# Patient Record
Sex: FEMALE | Race: BLACK OR AFRICAN AMERICAN | Employment: FULL TIME | URBAN - METROPOLITAN AREA
[De-identification: names, ages, dates, MRNs, and addresses within clinical notes are randomized per-mention and may not be internally consistent; named-entity substitution may affect disease eponyms.]

---

## 2019-08-20 ENCOUNTER — ANESTHESIA EVENT (OUTPATIENT)
Dept: SURGERY | Age: 54
End: 2019-08-20
Payer: OTHER GOVERNMENT

## 2019-08-20 RX ORDER — OMEPRAZOLE 40 MG/1
CAPSULE, DELAYED RELEASE ORAL
COMMUNITY
Start: 2019-05-06

## 2019-08-20 RX ORDER — FLUTICASONE PROPIONATE 50 MCG
SPRAY, SUSPENSION (ML) NASAL
COMMUNITY
Start: 2018-02-28

## 2019-08-20 RX ORDER — LORATADINE 10 MG/1
TABLET ORAL
COMMUNITY
Start: 2018-02-13

## 2019-08-20 RX ORDER — POLYETHYLENE GLYCOL 3350 17 G/17G
POWDER, FOR SOLUTION ORAL
COMMUNITY
Start: 2019-06-19

## 2019-08-20 RX ORDER — DICLOFENAC SODIUM 10 MG/G
GEL TOPICAL
COMMUNITY
Start: 2018-02-13

## 2019-08-20 NOTE — PERIOP NOTES
PAT - SURGICAL PRE-ADMISSION INSTRUCTIONS    NAME:  Sylvester Hernandez                                                          TODAY'S DATE:  8/20/2019    SURGERY DATE:  8/21/2019                                  SURGERY ARRIVAL TIME:   1145 PER OFFICE, TBV    1. Do NOT eat or drink anything, including candy or gum, after MIDNIGHT on 8/20/19 , unless you have specific instructions from your Surgeon or Anesthesia Provider to do so. 2. No smoking on the day of surgery. 3. No alcohol 24 hours prior to the day of surgery. 4. No recreational drugs for one week prior to the day of surgery. 5. Leave all valuables, including money/purse, at home. 6. Remove all jewelry, nail polish, makeup (including mascara); no lotions, powders, deodorant, or perfume/cologne/after shave. 7. Glasses/Contact lenses and Dentures may be worn to the hospital.  They will be removed prior to surgery. 8. Call your doctor if symptoms of a cold or illness develop within 24 ours prior to surgery. 9. AN ADULT MUST DRIVE YOU HOME AFTER OUTPATIENT SURGERY. 10. If you are having an OUTPATIENT procedure, please make arrangements for a responsible adult to be with you for 24 hours after your surgery. 11. If you are admitted to the hospital, you will be assigned to a bed after surgery is complete. Normally a family member will not be able to see you until you are in your assigned bed. 15. Family is encouraged to accompany you to the hospital.  We ask visitors in the treatment area to be limited to ONE person at a time to ensure patient privacy. EXCEPTIONS WILL BE MADE AS NEEDED. 15. Children under 12 are discouraged from entering the treatment area and need to be supervised by an adult when in the waiting room. Special Instructions:     Take these medications the morning of surgery with a sip of water:  OMEPRAZOLE    Patient Prep:    shower with anti-bacterial soap    These surgical instructions were reviewed with PATIENT during the PAT PHONE CALL.   Directions: On the morning of surgery, please go to the [address] [place] [address]. Enter the building from the [facility] entrance, 1st floor (next to the Emergency Room entrance). Take the elevator to the 2nd floor. Sign in at the Registration Desk.     If you have any questions and/or concerns, please do not hesitate to call:  (Prior to the day of surgery)  [facility] unit:  [phone]  (Day of surgery)  [facility] unit:  [phone]

## 2019-08-21 ENCOUNTER — HOSPITAL ENCOUNTER (OUTPATIENT)
Age: 54
Setting detail: OUTPATIENT SURGERY
Discharge: HOME | End: 2019-08-21
Attending: PLASTIC SURGERY | Admitting: PLASTIC SURGERY
Payer: OTHER GOVERNMENT

## 2019-08-21 ENCOUNTER — ANESTHESIA (OUTPATIENT)
Dept: SURGERY | Age: 54
End: 2019-08-21
Payer: OTHER GOVERNMENT

## 2019-08-21 VITALS
OXYGEN SATURATION: 96 % | SYSTOLIC BLOOD PRESSURE: 108 MMHG | TEMPERATURE: 97.6 F | HEIGHT: 64 IN | DIASTOLIC BLOOD PRESSURE: 68 MMHG | BODY MASS INDEX: 31.66 KG/M2 | RESPIRATION RATE: 16 BRPM | WEIGHT: 185.44 LBS | HEART RATE: 72 BPM

## 2019-08-21 DIAGNOSIS — M67.441 GANGLION OF FLEXOR TENDON SHEATH OF RIGHT MIDDLE FINGER: ICD-10-CM

## 2019-08-21 DIAGNOSIS — M65.331 TRIGGER FINGER, RIGHT MIDDLE FINGER: ICD-10-CM

## 2019-08-21 DIAGNOSIS — Z98.890 POST-OPERATIVE STATE: Primary | ICD-10-CM

## 2019-08-21 PROCEDURE — 88304 TISSUE EXAM BY PATHOLOGIST: CPT

## 2019-08-21 PROCEDURE — 77030002986 HC SUT PROL J&J -A: Performed by: PLASTIC SURGERY

## 2019-08-21 PROCEDURE — 74011000250 HC RX REV CODE- 250: Performed by: PLASTIC SURGERY

## 2019-08-21 PROCEDURE — 74011250636 HC RX REV CODE- 250/636: Performed by: NURSE ANESTHETIST, CERTIFIED REGISTERED

## 2019-08-21 PROCEDURE — 77030018836 HC SOL IRR NACL ICUM -A: Performed by: PLASTIC SURGERY

## 2019-08-21 PROCEDURE — 74011250636 HC RX REV CODE- 250/636

## 2019-08-21 PROCEDURE — 76210000020 HC REC RM PH II FIRST 0.5 HR: Performed by: PLASTIC SURGERY

## 2019-08-21 PROCEDURE — 77030033827 HC SLV COMPR SCD THGH COVD -B: Performed by: PLASTIC SURGERY

## 2019-08-21 PROCEDURE — 76010000138 HC OR TIME 0.5 TO 1 HR: Performed by: PLASTIC SURGERY

## 2019-08-21 PROCEDURE — 74011250636 HC RX REV CODE- 250/636: Performed by: PLASTIC SURGERY

## 2019-08-21 PROCEDURE — 74011250637 HC RX REV CODE- 250/637: Performed by: NURSE ANESTHETIST, CERTIFIED REGISTERED

## 2019-08-21 PROCEDURE — 77030021122 HC SPLNT MAT FST BSNM -A: Performed by: PLASTIC SURGERY

## 2019-08-21 PROCEDURE — 76060000032 HC ANESTHESIA 0.5 TO 1 HR: Performed by: PLASTIC SURGERY

## 2019-08-21 PROCEDURE — 77030002996 HC SUT SLK J&J -A: Performed by: PLASTIC SURGERY

## 2019-08-21 PROCEDURE — 74011000272 HC RX REV CODE- 272: Performed by: PLASTIC SURGERY

## 2019-08-21 PROCEDURE — 77030002933 HC SUT MCRYL J&J -A: Performed by: PLASTIC SURGERY

## 2019-08-21 RX ORDER — MIDAZOLAM HYDROCHLORIDE 1 MG/ML
INJECTION, SOLUTION INTRAMUSCULAR; INTRAVENOUS AS NEEDED
Status: DISCONTINUED | OUTPATIENT
Start: 2019-08-21 | End: 2019-08-21 | Stop reason: HOSPADM

## 2019-08-21 RX ORDER — LIDOCAINE HYDROCHLORIDE 20 MG/ML
INJECTION, SOLUTION EPIDURAL; INFILTRATION; INTRACAUDAL; PERINEURAL AS NEEDED
Status: DISCONTINUED | OUTPATIENT
Start: 2019-08-21 | End: 2019-08-21 | Stop reason: HOSPADM

## 2019-08-21 RX ORDER — IBUPROFEN 600 MG/1
TABLET ORAL
Qty: 20 TAB | Refills: 0 | Status: SHIPPED | OUTPATIENT
Start: 2019-08-21

## 2019-08-21 RX ORDER — FAMOTIDINE 20 MG/1
20 TABLET, FILM COATED ORAL ONCE
Status: COMPLETED | OUTPATIENT
Start: 2019-08-21 | End: 2019-08-21

## 2019-08-21 RX ORDER — CEFAZOLIN SODIUM 2 G/50ML
2 SOLUTION INTRAVENOUS
Status: DISCONTINUED | OUTPATIENT
Start: 2019-08-21 | End: 2019-08-21 | Stop reason: HOSPADM

## 2019-08-21 RX ORDER — LIDOCAINE HYDROCHLORIDE 10 MG/ML
0.1 INJECTION, SOLUTION EPIDURAL; INFILTRATION; INTRACAUDAL; PERINEURAL AS NEEDED
Status: DISCONTINUED | OUTPATIENT
Start: 2019-08-21 | End: 2019-08-21 | Stop reason: HOSPADM

## 2019-08-21 RX ORDER — ONDANSETRON HYDROCHLORIDE 4 MG/2ML
INJECTION, SOLUTION INTRAMUSCULAR; INTRAVENOUS AS NEEDED
Status: DISCONTINUED | OUTPATIENT
Start: 2019-08-21 | End: 2019-08-21

## 2019-08-21 RX ORDER — PROPOFOL 10 MG/ML
INJECTION, EMULSION INTRAVENOUS
Status: DISCONTINUED | OUTPATIENT
Start: 2019-08-21 | End: 2019-08-21 | Stop reason: HOSPADM

## 2019-08-21 RX ORDER — PROPOFOL 10 MG/ML
INJECTION, EMULSION INTRAVENOUS AS NEEDED
Status: DISCONTINUED | OUTPATIENT
Start: 2019-08-21 | End: 2019-08-21 | Stop reason: HOSPADM

## 2019-08-21 RX ORDER — SODIUM CHLORIDE, SODIUM LACTATE, POTASSIUM CHLORIDE, CALCIUM CHLORIDE 600; 310; 30; 20 MG/100ML; MG/100ML; MG/100ML; MG/100ML
50 INJECTION, SOLUTION INTRAVENOUS CONTINUOUS
Status: DISCONTINUED | OUTPATIENT
Start: 2019-08-21 | End: 2019-08-21 | Stop reason: HOSPADM

## 2019-08-21 RX ORDER — FENTANYL CITRATE 50 UG/ML
INJECTION, SOLUTION INTRAMUSCULAR; INTRAVENOUS AS NEEDED
Status: DISCONTINUED | OUTPATIENT
Start: 2019-08-21 | End: 2019-08-21 | Stop reason: HOSPADM

## 2019-08-21 RX ORDER — HYDROCODONE BITARTRATE AND ACETAMINOPHEN 5; 325 MG/1; MG/1
1 TABLET ORAL
Qty: 5 TAB | Refills: 0 | Status: SHIPPED | OUTPATIENT
Start: 2019-08-21 | End: 2019-08-28

## 2019-08-21 RX ADMIN — PROPOFOL 20 MG: 10 INJECTION, EMULSION INTRAVENOUS at 14:28

## 2019-08-21 RX ADMIN — FENTANYL CITRATE 12.5 MCG: 50 INJECTION, SOLUTION INTRAMUSCULAR; INTRAVENOUS at 14:39

## 2019-08-21 RX ADMIN — SODIUM CHLORIDE, SODIUM LACTATE, POTASSIUM CHLORIDE, AND CALCIUM CHLORIDE 50 ML/HR: 600; 310; 30; 20 INJECTION, SOLUTION INTRAVENOUS at 13:08

## 2019-08-21 RX ADMIN — FAMOTIDINE 20 MG: 20 TABLET ORAL at 13:08

## 2019-08-21 RX ADMIN — FENTANYL CITRATE 25 MCG: 50 INJECTION, SOLUTION INTRAMUSCULAR; INTRAVENOUS at 14:25

## 2019-08-21 RX ADMIN — FENTANYL CITRATE 12.5 MCG: 50 INJECTION, SOLUTION INTRAMUSCULAR; INTRAVENOUS at 15:00

## 2019-08-21 RX ADMIN — PROPOFOL 100 MCG/KG/MIN: 10 INJECTION, EMULSION INTRAVENOUS at 14:27

## 2019-08-21 RX ADMIN — PROPOFOL 10 MG: 10 INJECTION, EMULSION INTRAVENOUS at 14:29

## 2019-08-21 RX ADMIN — LIDOCAINE HYDROCHLORIDE 20 MG: 20 INJECTION, SOLUTION EPIDURAL; INFILTRATION; INTRACAUDAL; PERINEURAL at 14:26

## 2019-08-21 RX ADMIN — MIDAZOLAM HYDROCHLORIDE 2 MG: 1 INJECTION, SOLUTION INTRAMUSCULAR; INTRAVENOUS at 14:22

## 2019-08-21 RX ADMIN — PROPOFOL 50 MG: 10 INJECTION, EMULSION INTRAVENOUS at 14:26

## 2019-08-21 RX ADMIN — FENTANYL CITRATE 12.5 MCG: 50 INJECTION, SOLUTION INTRAMUSCULAR; INTRAVENOUS at 14:42

## 2019-08-21 RX ADMIN — FENTANYL CITRATE 12.5 MCG: 50 INJECTION, SOLUTION INTRAMUSCULAR; INTRAVENOUS at 14:53

## 2019-08-21 RX ADMIN — PROPOFOL 20 MG: 10 INJECTION, EMULSION INTRAVENOUS at 14:27

## 2019-08-21 NOTE — DISCHARGE INSTRUCTIONS
Patient armband removed and given to patient to take home. Patient was informed of the privacy risks if armband lost or stolen    DISCHARGE SUMMARY from Nurse    PATIENT INSTRUCTIONS:    After general anesthesia or intravenous sedation, for 24 hours or while taking prescription Narcotics:  · Limit your activities  · Do not drive and operate hazardous machinery  · Do not make important personal or business decisions  · Do  not drink alcoholic beverages  · If you have not urinated within 8 hours after discharge, please contact your surgeon on call. Report the following to your surgeon:  · Excessive pain, swelling, redness or odor of or around the surgical area  · Temperature over 100.5  · Nausea and vomiting lasting longer than 4 hours or if unable to take medications  · Any signs of decreased circulation or nerve impairment to extremity: change in color, persistent  numbness, tingling, coldness or increase pain  · Any questions    What to do at Home:    These are general instructions for a healthy lifestyle:    No smoking/ No tobacco products/ Avoid exposure to second hand smoke  Surgeon General's Warning:  Quitting smoking now greatly reduces serious risk to your health. Obesity, smoking, and sedentary lifestyle greatly increases your risk for illness    A healthy diet, regular physical exercise & weight monitoring are important for maintaining a healthy lifestyle    You may be retaining fluid if you have a history of heart failure or if you experience any of the following symptoms:  Weight gain of 3 pounds or more overnight or 5 pounds in a week, increased swelling in our hands or feet or shortness of breath while lying flat in bed. Please call your doctor as soon as you notice any of these symptoms; do not wait until your next office visit. The discharge information has been reviewed with the {PATIENT PARENT GUARDIAN:08281}. The {PATIENT PARENT GUARDIAN:65095} verbalized understanding.   Discharge medications reviewed with the {Dishcarge meds reviewed YJWZ:71388} and appropriate educational materials and side effects teaching were provided.   ___________________________________________________________________________________________________________________________________

## 2019-08-21 NOTE — BRIEF OP NOTE
BRIEF OPERATIVE NOTE      BRIEF OPERATIVE NOTE    Patient: Lis Costa MRN: [id number]  CSN: [id number]    YOB: 1965  Age: 49 y.o. Sex: female        Date of Procedure: 8/21/2019     Preoperative Diagnosis: Right Middle Finger Mass, Right Middle Finger Trigger Digit Release M67.40, M65.331    Postoperative Diagnosis: Right Middle Finger Mass, Right Middle Finger Trigger Digit Release M67.40, M65.331      Procedure: Procedure(s):  Excision of Right Middle Finger Mass, Right Middle Finger Trigger Digit Release     Surgeon: Dudley Brooks MD      First Assistant:   EDDA  Anesthesia Staff: Anesthesiologist: Mike Corrigan MD  CRNA: Janice Bell CRNA    Anesthesia: MAC      Local Used:   1 % lidocaine and 0.5 % marcaine  50:50 mix    Fluid:  400 cc crystalloid    Tourniquet Time:    Total Tourniquet Time Documented:  Upper Arm (Right) - 27 minutes  Total: Upper Arm (Right) - 27 minutes   @  175 mm Hg.     Estimated Blood Loss: < 5 cc    Specimens:   ID Type Source Tests Collected by Time Destination   1 : ganglion cyst, right middle finger Preservative   Dane Foreman MD 8/21/2019 1500 Pathology        Implants: * No implants in log *    Findings: Large ganglion at the A2 pulley    Complications: None; patient tolerated the procedure well.  Nikolay Crocker MD  8/21/2019  3:22 PM    Dictation Number: #[id number]

## 2019-08-21 NOTE — PERIOP NOTES
Phase 2 Recovery Summary  Patient arrived to Phase 2 at 1521  Report received from OR  Vitals:    08/20/19 0935 08/21/19 1303 08/21/19 1521 08/21/19 1526   BP:  123/79 108/68 108/68   Pulse:  72 72 72   Resp:  16 14 16   Temp:  97.6 °F (36.4 °C)     SpO2:  100% 96% 96%   Weight: 83.5 kg (184 lb) 84.1 kg (185 lb 7 oz)     Height: 5' 4\" (1.626 m)          oriented to time, place, person and situation    Lines and Drains  Peripheral Intravenous Line:  Removed prior to discharge     Wound  Wound Finger (Comment which one) Right middle (Active)   Dressing Status Clean, dry, and intact 8/21/2019  3:31 PM   Dressing Type 4 x 4;Xeroform; Other (Comment) 8/21/2019  3:31 PM   Incision Site Well Approximated Yes 8/21/2019  3:11 PM   Drainage Amount None 8/21/2019  3:31 PM   Number of days: 0      Discharge  instructions reviewed with patient and . They verbalized understanding.      Patient discharged to home with   at [address]

## 2019-08-21 NOTE — ANESTHESIA POSTPROCEDURE EVALUATION
Procedure(s):  Excision of Right Middle Finger Mass, Right Middle Finger Trigger Digit Release. MAC    Anesthesia Post Evaluation      Multimodal analgesia: multimodal analgesia used between 6 hours prior to anesthesia start to PACU discharge  Patient location during evaluation: bedside  Patient participation: complete - patient participated  Level of consciousness: awake  Pain management: adequate  Airway patency: patent  Anesthetic complications: no  Cardiovascular status: stable  Respiratory status: acceptable  Hydration status: acceptable  Post anesthesia nausea and vomiting:  controlled      No vitals data found for the desired time range.

## 2019-08-21 NOTE — INTERVAL H&P NOTE
H&P Update:  Nati Snyder was seen and examined. History and physical has been reviewed. The patient has been examined. There have been no significant clinical changes since the completion of the originally dated History and Physical.  Discussed right middle finger cyst excision and trigger release. All questions answered.

## 2019-08-21 NOTE — PERIOP NOTES
Pre-Op Summary    Pt arrived via car with family/friend and is oriented to time, place, person and situation. Patient with steady gait with none assistive devices. Visit Vitals  /79 (BP 1 Location: Left arm, BP Patient Position: At rest)   Pulse 72   Temp 97.6 °F (36.4 °C)   Resp 16   Ht 5' 4\" (1.626 m)   Wt 84.1 kg (185 lb 7 oz)   SpO2 100%   BMI 31.83 kg/m²       Peripheral IV located on Left antecubital .    Patients belongings are located with . Patient's point of contact is Pancho Kumar,  and their contact number is: [phone]. They will be in the waiting room. They are able to receive medication information. They will be their ride home.

## 2019-08-22 NOTE — OP NOTES
[facility]  OPERATIVE REPORT    Name:  Carlos Ortega  MR#:   [id number]  :  1965  ACCOUNT #:  [de-identified]  DATE OF SERVICE:  2019    PREOPERATIVE DIAGNOSES:  1. Right middle finger mass consistent with ganglion cyst.  2.  Trigger finger, right middle finger. POSTOPERATIVE DIAGNOSES:  1. Right middle finger mass consistent with ganglion cyst.  2.  Trigger finger, right middle finger. PROCEDURES PERFORMED:  1. Excision of right middle finger ganglion cyst.  2.  Release of right middle finger trigger digit. SURGEON:  Chad Ren MD    ASSISTANT:  EDDA.    ANESTHESIA:  Local (1% lidocaine and 0.5% Marcaine plain) and MAC. FLUIDS ADMINISTERED:  400 mL of crystalloid. TOURNIQUET TIME:  26 minutes at 175 mmHg. COMPLICATIONS:  None. SPECIMENS REMOVED:  Ganglion cyst from the right middle finger A2 pulley region. IMPLANTS:  None. ESTIMATED BLOOD LOSS:  Less than 5 mL. FINDINGS:  Large ganglion cyst at the A2 pulley. PROCEDURE:  The patient is a 52-year-old female with a history of pain in the right middle finger. She was previously seen, underwent evaluation, found to have exam consistent with ganglion cyst at the A2 pulley region and trigger finger. We undertook discussions regarding treatment options and she has elected to undergo surgical excision of the ganglion cyst and release of right middle finger trigger digit. After proper informed consent was obtained, she was brought to the operating room in the surgical gurney, and the right upper extremity was extended on an arm board. A well-padded tourniquet was placed on the right upper extremity and once sedation was achieved by Anesthesia, surgical sites were cleansed with isopropyl alcohol and infiltrated with local anesthetic. Right upper extremity was then prepped and draped for a sterile field using ChloraPrep solution. Incision was designed using a skin marker.   Veronica incision was utilized with the vertex of the incision overlying the mass at the A2 pulley region of the middle finger. Arm was elevated for gravity exsanguination and tourniquet was inflated to 175 mmHg. Incision was made. Dissection carried through the skin into the subcutaneous tissues using sharp and blunt dissection. Neurovascular bundles were identified and preserved and flexor sheath was identified. Mass was encountered at the distal aspect of the A2 pulley. Mass was isolated from the surrounding tissues using blunt and sharp dissection under loupe magnification. Mass was found to emanate through the A2 pulley. It was excised in its entirety including a small cuff of A2 pulley retinaculum. This was passed off the field in formalin to be evaluated for histology. Attention was then directed to the A1 pulley release. Through the proximal aspect of the incision, dissection was carried through the proximal tissues. A1 pulley was identified and released using tenotomy scissors under loupe magnification. Full release was accomplished and ranging the finger revealed no evidence of catching or locking. At this point, tourniquet was deflated thus restoring blood flow to the hand. Once hemostasis was assured, closure of the incision was accomplished using 4-0 Prolene in a horizontal mattress fashion. Sterile dressings were applied consisting of Xeroform, 4 x 4s and Blanca wrap. She was awakened from sedation and taken to the phase II recovery in stable condition. There were no complications. The patient tolerated the procedure well. Counts were correct at the conclusion of the procedure. Dr. Pastor Mancera performed the entire procedure.       MD KATHY Damon/[email or web address]  D:  08/21/2019 15:34  T:  08/21/2019 17:34  JOB #:  [id number]

## 2024-05-20 ENCOUNTER — TRANSCRIBE ORDERS (OUTPATIENT)
Facility: HOSPITAL | Age: 59
End: 2024-05-20

## 2024-05-20 ENCOUNTER — HOSPITAL ENCOUNTER (OUTPATIENT)
Facility: HOSPITAL | Age: 59
Discharge: HOME | End: 2024-05-23
Payer: OTHER GOVERNMENT

## 2024-05-20 DIAGNOSIS — M17.12 PRIMARY OSTEOARTHRITIS OF LEFT KNEE: ICD-10-CM

## 2024-05-20 DIAGNOSIS — M17.12 PRIMARY OSTEOARTHRITIS OF LEFT KNEE: Primary | ICD-10-CM

## 2024-05-20 LAB
ANION GAP SERPL CALC-SCNC: 6 MMOL/L (ref 3–18)
APPEARANCE UR: CLEAR
APTT PPP: 25.7 SEC (ref 23–36.4)
BACTERIA URNS QL MICRO: ABNORMAL /HPF
BASOPHILS # BLD: 0.1 K/UL (ref 0–0.1)
BASOPHILS NFR BLD: 1 % (ref 0–2)
BILIRUB UR QL: NEGATIVE
BUN SERPL-MCNC: 12 MG/DL (ref 7–18)
BUN/CREAT SERPL: 15 (ref 12–20)
CALCIUM SERPL-MCNC: 9.8 MG/DL (ref 8.5–10.1)
CHLORIDE SERPL-SCNC: 102 MMOL/L (ref 100–111)
CO2 SERPL-SCNC: 31 MMOL/L (ref 21–32)
COLOR UR: YELLOW
CREAT SERPL-MCNC: 0.8 MG/DL (ref 0.6–1.3)
DIFFERENTIAL METHOD BLD: NORMAL
EKG ATRIAL RATE: 70 BPM
EKG DIAGNOSIS: NORMAL
EKG P AXIS: 82 DEGREES
EKG P-R INTERVAL: 180 MS
EKG Q-T INTERVAL: 400 MS
EKG QRS DURATION: 78 MS
EKG QTC CALCULATION (BAZETT): 432 MS
EKG R AXIS: 51 DEGREES
EKG T AXIS: 59 DEGREES
EKG VENTRICULAR RATE: 70 BPM
EOSINOPHIL # BLD: 0.1 K/UL (ref 0–0.4)
EOSINOPHIL NFR BLD: 2 % (ref 0–5)
EPITH CASTS URNS QL MICRO: ABNORMAL /LPF (ref 0–5)
ERYTHROCYTE [DISTWIDTH] IN BLOOD BY AUTOMATED COUNT: 13.2 % (ref 11.6–14.5)
EST. AVERAGE GLUCOSE BLD GHB EST-MCNC: 111 MG/DL
GLUCOSE SERPL-MCNC: 95 MG/DL (ref 74–99)
GLUCOSE UR STRIP.AUTO-MCNC: NEGATIVE MG/DL
HBA1C MFR BLD: 5.5 % (ref 4.2–5.6)
HCG SERPL QL: NEGATIVE
HCT VFR BLD AUTO: 42.8 % (ref 35–45)
HGB BLD-MCNC: 13.8 G/DL (ref 12–16)
HGB UR QL STRIP: ABNORMAL
IMM GRANULOCYTES # BLD AUTO: 0 K/UL (ref 0–0.04)
IMM GRANULOCYTES NFR BLD AUTO: 0 % (ref 0–0.5)
INR PPP: 0.9 (ref 0.9–1.1)
KETONES UR QL STRIP.AUTO: NEGATIVE MG/DL
LEUKOCYTE ESTERASE UR QL STRIP.AUTO: ABNORMAL
LYMPHOCYTES # BLD: 2.7 K/UL (ref 0.9–3.6)
LYMPHOCYTES NFR BLD: 46 % (ref 21–52)
MCH RBC QN AUTO: 29.9 PG (ref 24–34)
MCHC RBC AUTO-ENTMCNC: 32.2 G/DL (ref 31–37)
MCV RBC AUTO: 92.6 FL (ref 78–100)
MONOCYTES # BLD: 0.5 K/UL (ref 0.05–1.2)
MONOCYTES NFR BLD: 8 % (ref 3–10)
NEUTS SEG # BLD: 2.6 K/UL (ref 1.8–8)
NEUTS SEG NFR BLD: 44 % (ref 40–73)
NITRITE UR QL STRIP.AUTO: POSITIVE
NRBC # BLD: 0 K/UL (ref 0–0.01)
NRBC BLD-RTO: 0 PER 100 WBC
PH UR STRIP: 6.5 (ref 5–8)
PLATELET # BLD AUTO: 334 K/UL (ref 135–420)
PMV BLD AUTO: 9.5 FL (ref 9.2–11.8)
POTASSIUM SERPL-SCNC: 3.7 MMOL/L (ref 3.5–5.5)
PROT UR STRIP-MCNC: NEGATIVE MG/DL
PROTHROMBIN TIME: 12.5 SEC (ref 11.9–14.7)
RBC # BLD AUTO: 4.62 M/UL (ref 4.2–5.3)
RBC #/AREA URNS HPF: ABNORMAL /HPF (ref 0–5)
SODIUM SERPL-SCNC: 139 MMOL/L (ref 136–145)
SP GR UR REFRACTOMETRY: 1.02 (ref 1–1.03)
UROBILINOGEN UR QL STRIP.AUTO: 1 EU/DL (ref 0.2–1)
WBC # BLD AUTO: 6 K/UL (ref 4.6–13.2)
WBC URNS QL MICRO: ABNORMAL /HPF (ref 0–5)

## 2024-05-20 PROCEDURE — 84703 CHORIONIC GONADOTROPIN ASSAY: CPT

## 2024-05-20 PROCEDURE — 87086 URINE CULTURE/COLONY COUNT: CPT

## 2024-05-20 PROCEDURE — 81001 URINALYSIS AUTO W/SCOPE: CPT

## 2024-05-20 PROCEDURE — 83036 HEMOGLOBIN GLYCOSYLATED A1C: CPT

## 2024-05-20 PROCEDURE — 87088 URINE BACTERIA CULTURE: CPT

## 2024-05-20 PROCEDURE — 71045 X-RAY EXAM CHEST 1 VIEW: CPT

## 2024-05-20 PROCEDURE — 85610 PROTHROMBIN TIME: CPT

## 2024-05-20 PROCEDURE — 93005 ELECTROCARDIOGRAM TRACING: CPT

## 2024-05-20 PROCEDURE — 36415 COLL VENOUS BLD VENIPUNCTURE: CPT

## 2024-05-20 PROCEDURE — 85025 COMPLETE CBC W/AUTO DIFF WBC: CPT

## 2024-05-20 PROCEDURE — 87186 SC STD MICRODIL/AGAR DIL: CPT

## 2024-05-20 PROCEDURE — 85730 THROMBOPLASTIN TIME PARTIAL: CPT

## 2024-05-20 PROCEDURE — 80048 BASIC METABOLIC PNL TOTAL CA: CPT

## 2024-05-21 LAB
BACTERIA SPEC CULT: NORMAL
BACTERIA SPEC CULT: NORMAL
SERVICE CMNT-IMP: NORMAL

## 2024-05-22 LAB
BACTERIA SPEC CULT: ABNORMAL
CC UR VC: ABNORMAL
SERVICE CMNT-IMP: ABNORMAL

## 2024-05-29 ENCOUNTER — HOSPITAL ENCOUNTER (OUTPATIENT)
Facility: HOSPITAL | Age: 59
Discharge: HOME | End: 2024-06-01
Attending: ORTHOPAEDIC SURGERY
Payer: OTHER GOVERNMENT

## 2024-05-29 DIAGNOSIS — M17.12 ARTHRITIS OF LEFT KNEE: ICD-10-CM

## 2024-05-29 PROCEDURE — 73700 CT LOWER EXTREMITY W/O DYE: CPT

## 2024-06-04 ENCOUNTER — HOSPITAL ENCOUNTER (OUTPATIENT)
Facility: HOSPITAL | Age: 59
Discharge: HOME | End: 2024-06-07
Payer: OTHER GOVERNMENT

## 2024-06-04 ENCOUNTER — TRANSCRIBE ORDERS (OUTPATIENT)
Facility: HOSPITAL | Age: 59
End: 2024-06-04

## 2024-06-04 DIAGNOSIS — M17.12 OSTEOARTHRITIS OF LEFT KNEE, UNSPECIFIED OSTEOARTHRITIS TYPE: ICD-10-CM

## 2024-06-04 DIAGNOSIS — M17.12 OSTEOARTHRITIS OF LEFT KNEE, UNSPECIFIED OSTEOARTHRITIS TYPE: Primary | ICD-10-CM

## 2024-06-04 LAB
APPEARANCE UR: CLEAR
BACTERIA URNS QL MICRO: NORMAL /HPF
BILIRUB UR QL: NEGATIVE
COLOR UR: YELLOW
EPITH CASTS URNS QL MICRO: NORMAL /LPF (ref 0–5)
GLUCOSE UR STRIP.AUTO-MCNC: NEGATIVE MG/DL
HGB UR QL STRIP: ABNORMAL
KETONES UR QL STRIP.AUTO: NEGATIVE MG/DL
LEUKOCYTE ESTERASE UR QL STRIP.AUTO: NEGATIVE
NITRITE UR QL STRIP.AUTO: NEGATIVE
PH UR STRIP: 7 (ref 5–8)
PROT UR STRIP-MCNC: NEGATIVE MG/DL
RBC #/AREA URNS HPF: NORMAL /HPF (ref 0–5)
SP GR UR REFRACTOMETRY: 1.03 (ref 1–1.03)
UROBILINOGEN UR QL STRIP.AUTO: 1 EU/DL (ref 0.2–1)
WBC URNS QL MICRO: NEGATIVE /HPF (ref 0–5)

## 2024-06-04 PROCEDURE — 87086 URINE CULTURE/COLONY COUNT: CPT

## 2024-06-04 PROCEDURE — 81001 URINALYSIS AUTO W/SCOPE: CPT

## 2024-06-04 PROCEDURE — 36415 COLL VENOUS BLD VENIPUNCTURE: CPT

## 2024-06-05 LAB
BACTERIA SPEC CULT: NORMAL
SERVICE CMNT-IMP: NORMAL

## 2024-06-17 ENCOUNTER — HOSPITAL ENCOUNTER (OUTPATIENT)
Facility: HOSPITAL | Age: 59
Setting detail: RECURRING SERIES
Discharge: HOME | End: 2024-06-20
Payer: OTHER GOVERNMENT

## 2024-06-17 ENCOUNTER — TELEPHONE (OUTPATIENT)
Facility: HOSPITAL | Age: 59
End: 2024-06-17

## 2024-06-17 PROCEDURE — 97535 SELF CARE MNGMENT TRAINING: CPT

## 2024-06-17 PROCEDURE — 97530 THERAPEUTIC ACTIVITIES: CPT

## 2024-06-17 PROCEDURE — 97161 PT EVAL LOW COMPLEX 20 MIN: CPT

## 2024-06-17 NOTE — PROGRESS NOTES
return to normal with this functional activity and improve safety on stairs.  Pre-Op Eval: step-to pattern, descents more difficult than ascents  Post-Op Reassess: TBD     Patient will be able to perform at least 15 reps of sit <> stands with good form/control during 30\" STS test to demonstrate improved LE strength and stability, thus reducing the patients risk of falls.  Pre-Op Eval: 9 reps, without use of hands but decreased eccentric control during stand to sits   Post-Op Reassess: TBD       Frequency / Duration: Patient to be seen 5x/week x 11 visits, then 3x/week x 24 visits (total of 35 visits).    Patient/ Caregiver education and instruction: Diagnosis, prognosis, self care, activity modification, and other answered patient's questions      [x]  Plan of care has been reviewed with PTA    Certification Period: n/a  Carolyn Mancera, PT 6/17/2024 8:05 AM  ____________________________________________________________________  I certify that the above Therapy Services are being furnished while the patient is under my care. I agree with the treatment plan and certify that this therapy is necessary.    Physician's Signature:____________________________Date:_________TIME:________                                      Chau Banuelos MD  Insurance: Payor: [employer] / Plan: [facility] PRIME / Product Type: *No Product type* /      ** Signature, Date and Time must be completed for valid certification **    [facility] at [facility]  [address] [place] [address]  Ph [phone]  Fx [phone]   
and return patient to his or her PLOF.  Pre-Op Eval: WNL community distances without AD  Post-Op Reassess: TBD    Patient will be able to safely negotiate a full flight of stairs with a step-through pattern while holding onto at least one handrail in order to return to normal with this functional activity and improve safety on stairs.  Pre-Op Eval: step-to pattern, descents more difficult than ascents  Post-Op Reassess: TBD    Patient will be able to perform at least 15 reps of sit <> stands with good form/control during 30\" STS test to demonstrate improved LE strength and stability, thus reducing the patients risk of falls.  Pre-Op Eval: 9 reps, without use of hands but decreased eccentric control during stand to sits   Post-Op Reassess: TBD    PLAN  [x]  Continue Plan of Care  []  Upgrade Activities as Tolerated       [x]  Update Interventions per Flow Sheet, as Tolerated by Patient       []  Discharge Due To:   [x]  Other: plan to reassess s/p surgery    Thank you for the referral to [facility].  Carolyn Mancera, PT     6/17/2024     8:05 AM

## 2024-06-18 ENCOUNTER — ANESTHESIA EVENT (OUTPATIENT)
Facility: HOSPITAL | Age: 59
DRG: 470 | End: 2024-06-18
Payer: OTHER GOVERNMENT

## 2024-06-19 ENCOUNTER — APPOINTMENT (OUTPATIENT)
Facility: HOSPITAL | Age: 59
DRG: 470 | End: 2024-06-19
Attending: ORTHOPAEDIC SURGERY
Payer: OTHER GOVERNMENT

## 2024-06-19 ENCOUNTER — ANESTHESIA (OUTPATIENT)
Facility: HOSPITAL | Age: 59
DRG: 470 | End: 2024-06-19
Payer: OTHER GOVERNMENT

## 2024-06-19 ENCOUNTER — HOSPITAL ENCOUNTER (INPATIENT)
Facility: HOSPITAL | Age: 59
LOS: 1 days | Discharge: HOME | DRG: 470 | End: 2024-06-20
Attending: ORTHOPAEDIC SURGERY | Admitting: ORTHOPAEDIC SURGERY
Payer: OTHER GOVERNMENT

## 2024-06-19 DIAGNOSIS — M17.12 UNILATERAL PRIMARY OSTEOARTHRITIS, LEFT KNEE: Primary | ICD-10-CM

## 2024-06-19 LAB
ABO + RH BLD: NORMAL
BLOOD GROUP ANTIBODIES SERPL: NORMAL
SPECIMEN EXP DATE BLD: NORMAL

## 2024-06-19 PROCEDURE — 6360000002 HC RX W HCPCS: Performed by: NURSE ANESTHETIST, CERTIFIED REGISTERED

## 2024-06-19 PROCEDURE — 36415 COLL VENOUS BLD VENIPUNCTURE: CPT

## 2024-06-19 PROCEDURE — 6360000002 HC RX W HCPCS: Performed by: ANESTHESIOLOGY

## 2024-06-19 PROCEDURE — 1100000000 HC RM PRIVATE

## 2024-06-19 PROCEDURE — 97161 PT EVAL LOW COMPLEX 20 MIN: CPT

## 2024-06-19 PROCEDURE — 2580000003 HC RX 258: Performed by: ORTHOPAEDIC SURGERY

## 2024-06-19 PROCEDURE — 3600000005 HC SURGERY LEVEL 5 BASE: Performed by: ORTHOPAEDIC SURGERY

## 2024-06-19 PROCEDURE — 3700000001 HC ADD 15 MINUTES (ANESTHESIA): Performed by: ORTHOPAEDIC SURGERY

## 2024-06-19 PROCEDURE — 2500000003 HC RX 250 WO HCPCS: Performed by: ANESTHESIOLOGY

## 2024-06-19 PROCEDURE — 64447 NJX AA&/STRD FEMORAL NRV IMG: CPT | Performed by: ANESTHESIOLOGY

## 2024-06-19 PROCEDURE — 73560 X-RAY EXAM OF KNEE 1 OR 2: CPT

## 2024-06-19 PROCEDURE — 86850 RBC ANTIBODY SCREEN: CPT

## 2024-06-19 PROCEDURE — 97116 GAIT TRAINING THERAPY: CPT

## 2024-06-19 PROCEDURE — 6360000002 HC RX W HCPCS: Performed by: ORTHOPAEDIC SURGERY

## 2024-06-19 PROCEDURE — C9290 INJ, BUPIVACAINE LIPOSOME: HCPCS | Performed by: ORTHOPAEDIC SURGERY

## 2024-06-19 PROCEDURE — 3600000015 HC SURGERY LEVEL 5 ADDTL 15MIN: Performed by: ORTHOPAEDIC SURGERY

## 2024-06-19 PROCEDURE — 7100000000 HC PACU RECOVERY - FIRST 15 MIN: Performed by: ORTHOPAEDIC SURGERY

## 2024-06-19 PROCEDURE — 2720000010 HC SURG SUPPLY STERILE: Performed by: ORTHOPAEDIC SURGERY

## 2024-06-19 PROCEDURE — 86901 BLOOD TYPING SEROLOGIC RH(D): CPT

## 2024-06-19 PROCEDURE — 7100000001 HC PACU RECOVERY - ADDTL 15 MIN: Performed by: ORTHOPAEDIC SURGERY

## 2024-06-19 PROCEDURE — 2500000003 HC RX 250 WO HCPCS: Performed by: NURSE ANESTHETIST, CERTIFIED REGISTERED

## 2024-06-19 PROCEDURE — C1713 ANCHOR/SCREW BN/BN,TIS/BN: HCPCS | Performed by: ORTHOPAEDIC SURGERY

## 2024-06-19 PROCEDURE — 2500000003 HC RX 250 WO HCPCS: Performed by: ORTHOPAEDIC SURGERY

## 2024-06-19 PROCEDURE — 3700000000 HC ANESTHESIA ATTENDED CARE: Performed by: ORTHOPAEDIC SURGERY

## 2024-06-19 PROCEDURE — 6370000000 HC RX 637 (ALT 250 FOR IP): Performed by: ORTHOPAEDIC SURGERY

## 2024-06-19 PROCEDURE — 8E0Y0CZ ROBOTIC ASSISTED PROCEDURE OF LOWER EXTREMITY, OPEN APPROACH: ICD-10-PCS | Performed by: ORTHOPAEDIC SURGERY

## 2024-06-19 PROCEDURE — 0SRD0J9 REPLACEMENT OF LEFT KNEE JOINT WITH SYNTHETIC SUBSTITUTE, CEMENTED, OPEN APPROACH: ICD-10-PCS | Performed by: ORTHOPAEDIC SURGERY

## 2024-06-19 PROCEDURE — 2709999900 HC NON-CHARGEABLE SUPPLY: Performed by: ORTHOPAEDIC SURGERY

## 2024-06-19 PROCEDURE — 97530 THERAPEUTIC ACTIVITIES: CPT

## 2024-06-19 PROCEDURE — 86900 BLOOD TYPING SEROLOGIC ABO: CPT

## 2024-06-19 PROCEDURE — C1776 JOINT DEVICE (IMPLANTABLE): HCPCS | Performed by: ORTHOPAEDIC SURGERY

## 2024-06-19 DEVICE — BASEPLATE TIB SZ 3 AP44MM ML67MM KNEE TRITANIUM 4 CRUCFRM: Type: IMPLANTABLE DEVICE | Site: KNEE | Status: FUNCTIONAL

## 2024-06-19 DEVICE — IMPLANTABLE DEVICE: Type: IMPLANTABLE DEVICE | Site: KNEE | Status: FUNCTIONAL

## 2024-06-19 DEVICE — CEMENT BNE 40GM FULL DOSE PMMA W/O ANTIBIO M VISC RADPQ: Type: IMPLANTABLE DEVICE | Site: KNEE | Status: FUNCTIONAL

## 2024-06-19 DEVICE — INSERT TIB CNDYL STBL 3 9 MM KNEE 5/PK X3 TRIATHLON: Type: IMPLANTABLE DEVICE | Site: KNEE | Status: FUNCTIONAL

## 2024-06-19 DEVICE — IMPLANT PAT DIA29MM THK9MM X3 ASYM TRIATHLON: Type: IMPLANTABLE DEVICE | Site: KNEE | Status: FUNCTIONAL

## 2024-06-19 RX ORDER — POLYETHYLENE GLYCOL 3350 17 G/17G
17 POWDER, FOR SOLUTION ORAL DAILY
Status: DISCONTINUED | OUTPATIENT
Start: 2024-06-19 | End: 2024-06-20 | Stop reason: HOSPADM

## 2024-06-19 RX ORDER — DEXAMETHASONE SODIUM PHOSPHATE 10 MG/ML
INJECTION, SOLUTION INTRAMUSCULAR; INTRAVENOUS PRN
Status: DISCONTINUED | OUTPATIENT
Start: 2024-06-19 | End: 2024-06-19 | Stop reason: SDUPTHER

## 2024-06-19 RX ORDER — DEXMEDETOMIDINE HYDROCHLORIDE 100 UG/ML
INJECTION, SOLUTION INTRAVENOUS PRN
Status: DISCONTINUED | OUTPATIENT
Start: 2024-06-19 | End: 2024-06-19 | Stop reason: SDUPTHER

## 2024-06-19 RX ORDER — FLUTICASONE PROPIONATE 50 MCG
1 SPRAY, SUSPENSION (ML) NASAL DAILY
Status: DISCONTINUED | OUTPATIENT
Start: 2024-06-20 | End: 2024-06-20 | Stop reason: HOSPADM

## 2024-06-19 RX ORDER — DEXAMETHASONE SODIUM PHOSPHATE 4 MG/ML
INJECTION, SOLUTION INTRA-ARTICULAR; INTRALESIONAL; INTRAMUSCULAR; INTRAVENOUS; SOFT TISSUE PRN
Status: DISCONTINUED | OUTPATIENT
Start: 2024-06-19 | End: 2024-06-19 | Stop reason: SDUPTHER

## 2024-06-19 RX ORDER — SODIUM CHLORIDE 0.9 % (FLUSH) 0.9 %
5-40 SYRINGE (ML) INJECTION EVERY 12 HOURS SCHEDULED
Status: DISCONTINUED | OUTPATIENT
Start: 2024-06-19 | End: 2024-06-19 | Stop reason: HOSPADM

## 2024-06-19 RX ORDER — ACETAMINOPHEN 325 MG/1
650 TABLET ORAL EVERY 6 HOURS
Status: DISCONTINUED | OUTPATIENT
Start: 2024-06-19 | End: 2024-06-20 | Stop reason: HOSPADM

## 2024-06-19 RX ORDER — SODIUM CHLORIDE 0.9 % (FLUSH) 0.9 %
5-40 SYRINGE (ML) INJECTION PRN
Status: DISCONTINUED | OUTPATIENT
Start: 2024-06-19 | End: 2024-06-19 | Stop reason: HOSPADM

## 2024-06-19 RX ORDER — SODIUM CHLORIDE 9 MG/ML
INJECTION, SOLUTION INTRAVENOUS PRN
Status: DISCONTINUED | OUTPATIENT
Start: 2024-06-19 | End: 2024-06-19 | Stop reason: HOSPADM

## 2024-06-19 RX ORDER — ONDANSETRON 4 MG/1
4 TABLET, ORALLY DISINTEGRATING ORAL EVERY 8 HOURS PRN
Status: DISCONTINUED | OUTPATIENT
Start: 2024-06-19 | End: 2024-06-20 | Stop reason: HOSPADM

## 2024-06-19 RX ORDER — MIDAZOLAM HYDROCHLORIDE 1 MG/ML
INJECTION INTRAMUSCULAR; INTRAVENOUS PRN
Status: DISCONTINUED | OUTPATIENT
Start: 2024-06-19 | End: 2024-06-19 | Stop reason: SDUPTHER

## 2024-06-19 RX ORDER — SODIUM CHLORIDE, SODIUM LACTATE, POTASSIUM CHLORIDE, AND CALCIUM CHLORIDE .6; .31; .03; .02 G/100ML; G/100ML; G/100ML; G/100ML
IRRIGANT IRRIGATION PRN
Status: DISCONTINUED | OUTPATIENT
Start: 2024-06-19 | End: 2024-06-19 | Stop reason: ALTCHOICE

## 2024-06-19 RX ORDER — ACETAMINOPHEN 500 MG
1000 TABLET ORAL ONCE
Status: COMPLETED | OUTPATIENT
Start: 2024-06-19 | End: 2024-06-19

## 2024-06-19 RX ORDER — SODIUM CHLORIDE 0.9 % (FLUSH) 0.9 %
5-40 SYRINGE (ML) INJECTION PRN
Status: DISCONTINUED | OUTPATIENT
Start: 2024-06-19 | End: 2024-06-20 | Stop reason: HOSPADM

## 2024-06-19 RX ORDER — KETAMINE HCL IN NACL, ISO-OSM 100MG/10ML
SYRINGE (ML) INJECTION PRN
Status: DISCONTINUED | OUTPATIENT
Start: 2024-06-19 | End: 2024-06-19 | Stop reason: SDUPTHER

## 2024-06-19 RX ORDER — VENLAFAXINE HYDROCHLORIDE 75 MG/1
75 CAPSULE, EXTENDED RELEASE ORAL DAILY
Status: DISCONTINUED | OUTPATIENT
Start: 2024-06-20 | End: 2024-06-20 | Stop reason: HOSPADM

## 2024-06-19 RX ORDER — FENTANYL CITRATE 50 UG/ML
INJECTION, SOLUTION INTRAMUSCULAR; INTRAVENOUS PRN
Status: DISCONTINUED | OUTPATIENT
Start: 2024-06-19 | End: 2024-06-19 | Stop reason: SDUPTHER

## 2024-06-19 RX ORDER — PROPOFOL 10 MG/ML
INJECTION, EMULSION INTRAVENOUS PRN
Status: DISCONTINUED | OUTPATIENT
Start: 2024-06-19 | End: 2024-06-19 | Stop reason: SDUPTHER

## 2024-06-19 RX ORDER — NALOXONE HYDROCHLORIDE 0.4 MG/ML
INJECTION, SOLUTION INTRAMUSCULAR; INTRAVENOUS; SUBCUTANEOUS PRN
Status: DISCONTINUED | OUTPATIENT
Start: 2024-06-19 | End: 2024-06-19 | Stop reason: HOSPADM

## 2024-06-19 RX ORDER — OXYCODONE HYDROCHLORIDE 5 MG/1
10 TABLET ORAL PRN
Status: DISCONTINUED | OUTPATIENT
Start: 2024-06-19 | End: 2024-06-19 | Stop reason: HOSPADM

## 2024-06-19 RX ORDER — GLYCOPYRROLATE 0.2 MG/ML
INJECTION INTRAMUSCULAR; INTRAVENOUS PRN
Status: DISCONTINUED | OUTPATIENT
Start: 2024-06-19 | End: 2024-06-19 | Stop reason: SDUPTHER

## 2024-06-19 RX ORDER — SODIUM CHLORIDE 9 MG/ML
INJECTION, SOLUTION INTRAVENOUS PRN
Status: DISCONTINUED | OUTPATIENT
Start: 2024-06-19 | End: 2024-06-20 | Stop reason: HOSPADM

## 2024-06-19 RX ORDER — ROPIVACAINE HYDROCHLORIDE 5 MG/ML
INJECTION, SOLUTION EPIDURAL; INFILTRATION; PERINEURAL PRN
Status: DISCONTINUED | OUTPATIENT
Start: 2024-06-19 | End: 2024-06-19 | Stop reason: SDUPTHER

## 2024-06-19 RX ORDER — OXYCODONE HYDROCHLORIDE 5 MG/1
10 TABLET ORAL EVERY 4 HOURS PRN
Status: DISCONTINUED | OUTPATIENT
Start: 2024-06-19 | End: 2024-06-20 | Stop reason: HOSPADM

## 2024-06-19 RX ORDER — SENNOSIDES A AND B 8.6 MG/1
1 TABLET, FILM COATED ORAL DAILY PRN
Status: DISCONTINUED | OUTPATIENT
Start: 2024-06-19 | End: 2024-06-20 | Stop reason: HOSPADM

## 2024-06-19 RX ORDER — DEXAMETHASONE SODIUM PHOSPHATE 10 MG/ML
INJECTION, SOLUTION INTRAMUSCULAR; INTRAVENOUS
Status: COMPLETED
Start: 2024-06-19 | End: 2024-06-19

## 2024-06-19 RX ORDER — ONDANSETRON 2 MG/ML
4 INJECTION INTRAMUSCULAR; INTRAVENOUS
Status: DISCONTINUED | OUTPATIENT
Start: 2024-06-19 | End: 2024-06-19 | Stop reason: HOSPADM

## 2024-06-19 RX ORDER — TRANEXAMIC ACID 10 MG/ML
1000 INJECTION, SOLUTION INTRAVENOUS
Status: COMPLETED | OUTPATIENT
Start: 2024-06-19 | End: 2024-06-19

## 2024-06-19 RX ORDER — CETIRIZINE HYDROCHLORIDE 10 MG/1
10 TABLET ORAL DAILY
Status: DISCONTINUED | OUTPATIENT
Start: 2024-06-19 | End: 2024-06-20 | Stop reason: HOSPADM

## 2024-06-19 RX ORDER — ASPIRIN 325 MG
325 TABLET, DELAYED RELEASE (ENTERIC COATED) ORAL 2 TIMES DAILY
Status: DISCONTINUED | OUTPATIENT
Start: 2024-06-19 | End: 2024-06-19

## 2024-06-19 RX ORDER — FENTANYL CITRATE 50 UG/ML
INJECTION, SOLUTION INTRAMUSCULAR; INTRAVENOUS
Status: DISPENSED
Start: 2024-06-19 | End: 2024-06-19

## 2024-06-19 RX ORDER — SODIUM CHLORIDE 0.9 % (FLUSH) 0.9 %
5-40 SYRINGE (ML) INJECTION EVERY 12 HOURS SCHEDULED
Status: DISCONTINUED | OUTPATIENT
Start: 2024-06-19 | End: 2024-06-20 | Stop reason: HOSPADM

## 2024-06-19 RX ORDER — EPHEDRINE SULFATE/0.9% NACL/PF 50 MG/5 ML
SYRINGE (ML) INTRAVENOUS PRN
Status: DISCONTINUED | OUTPATIENT
Start: 2024-06-19 | End: 2024-06-19 | Stop reason: SDUPTHER

## 2024-06-19 RX ORDER — METAXALONE 800 MG/1
800 TABLET ORAL EVERY 8 HOURS PRN
Status: DISCONTINUED | OUTPATIENT
Start: 2024-06-19 | End: 2024-06-20 | Stop reason: HOSPADM

## 2024-06-19 RX ORDER — HYDROMORPHONE HYDROCHLORIDE 1 MG/ML
0.5 INJECTION, SOLUTION INTRAMUSCULAR; INTRAVENOUS; SUBCUTANEOUS EVERY 5 MIN PRN
Status: DISCONTINUED | OUTPATIENT
Start: 2024-06-19 | End: 2024-06-19 | Stop reason: HOSPADM

## 2024-06-19 RX ORDER — ASPIRIN 325 MG
325 TABLET, DELAYED RELEASE (ENTERIC COATED) ORAL 2 TIMES DAILY
Status: DISCONTINUED | OUTPATIENT
Start: 2024-06-20 | End: 2024-06-20 | Stop reason: HOSPADM

## 2024-06-19 RX ORDER — MONTELUKAST SODIUM 10 MG/1
10 TABLET ORAL NIGHTLY
Status: DISCONTINUED | OUTPATIENT
Start: 2024-06-19 | End: 2024-06-20 | Stop reason: HOSPADM

## 2024-06-19 RX ORDER — PHENYLEPHRINE HCL IN 0.9% NACL 1 MG/10 ML
SYRINGE (ML) INTRAVENOUS PRN
Status: DISCONTINUED | OUTPATIENT
Start: 2024-06-19 | End: 2024-06-19 | Stop reason: SDUPTHER

## 2024-06-19 RX ORDER — ONDANSETRON 2 MG/ML
4 INJECTION INTRAMUSCULAR; INTRAVENOUS EVERY 6 HOURS PRN
Status: DISCONTINUED | OUTPATIENT
Start: 2024-06-19 | End: 2024-06-20 | Stop reason: HOSPADM

## 2024-06-19 RX ORDER — LIDOCAINE HYDROCHLORIDE 20 MG/ML
INJECTION, SOLUTION EPIDURAL; INFILTRATION; INTRACAUDAL; PERINEURAL PRN
Status: DISCONTINUED | OUTPATIENT
Start: 2024-06-19 | End: 2024-06-19 | Stop reason: SDUPTHER

## 2024-06-19 RX ORDER — SODIUM CHLORIDE, SODIUM LACTATE, POTASSIUM CHLORIDE, CALCIUM CHLORIDE 600; 310; 30; 20 MG/100ML; MG/100ML; MG/100ML; MG/100ML
INJECTION, SOLUTION INTRAVENOUS CONTINUOUS
Status: DISCONTINUED | OUTPATIENT
Start: 2024-06-19 | End: 2024-06-20 | Stop reason: HOSPADM

## 2024-06-19 RX ORDER — OXYCODONE HYDROCHLORIDE 5 MG/1
5 TABLET ORAL EVERY 4 HOURS PRN
Status: DISCONTINUED | OUTPATIENT
Start: 2024-06-19 | End: 2024-06-20 | Stop reason: HOSPADM

## 2024-06-19 RX ORDER — FENTANYL CITRATE 50 UG/ML
50 INJECTION, SOLUTION INTRAMUSCULAR; INTRAVENOUS EVERY 5 MIN PRN
Status: DISCONTINUED | OUTPATIENT
Start: 2024-06-19 | End: 2024-06-19 | Stop reason: HOSPADM

## 2024-06-19 RX ORDER — DEXMEDETOMIDINE HYDROCHLORIDE 100 UG/ML
INJECTION, SOLUTION INTRAVENOUS
Status: COMPLETED
Start: 2024-06-19 | End: 2024-06-19

## 2024-06-19 RX ORDER — OXYCODONE HYDROCHLORIDE 5 MG/1
5 TABLET ORAL PRN
Status: DISCONTINUED | OUTPATIENT
Start: 2024-06-19 | End: 2024-06-19 | Stop reason: HOSPADM

## 2024-06-19 RX ORDER — MIDAZOLAM HYDROCHLORIDE 2 MG/2ML
INJECTION, SOLUTION INTRAMUSCULAR; INTRAVENOUS
Status: COMPLETED
Start: 2024-06-19 | End: 2024-06-19

## 2024-06-19 RX ADMIN — TRANEXAMIC ACID 1000 MG: 10 INJECTION, SOLUTION INTRAVENOUS at 10:42

## 2024-06-19 RX ADMIN — Medication 10 MG: at 11:20

## 2024-06-19 RX ADMIN — ACETAMINOPHEN 650 MG: 325 TABLET ORAL at 15:36

## 2024-06-19 RX ADMIN — Medication 10 MG: at 10:49

## 2024-06-19 RX ADMIN — OXYCODONE HYDROCHLORIDE 10 MG: 5 TABLET ORAL at 21:33

## 2024-06-19 RX ADMIN — SODIUM CHLORIDE, SODIUM LACTATE, POTASSIUM CHLORIDE, AND CALCIUM CHLORIDE: 600; 310; 30; 20 INJECTION, SOLUTION INTRAVENOUS at 14:31

## 2024-06-19 RX ADMIN — Medication 100 MCG: at 13:14

## 2024-06-19 RX ADMIN — ROPIVACAINE HYDROCHLORIDE 30 ML: 5 INJECTION, SOLUTION EPIDURAL; INFILTRATION; PERINEURAL at 09:49

## 2024-06-19 RX ADMIN — Medication 50 MCG: at 13:05

## 2024-06-19 RX ADMIN — Medication 10 MG: at 11:34

## 2024-06-19 RX ADMIN — HYDROMORPHONE HYDROCHLORIDE 0.5 MG: 1 INJECTION, SOLUTION INTRAMUSCULAR; INTRAVENOUS; SUBCUTANEOUS at 12:02

## 2024-06-19 RX ADMIN — OXYCODONE HYDROCHLORIDE 10 MG: 5 TABLET ORAL at 17:02

## 2024-06-19 RX ADMIN — Medication 20 MG: at 11:00

## 2024-06-19 RX ADMIN — SODIUM CHLORIDE, SODIUM LACTATE, POTASSIUM CHLORIDE, AND CALCIUM CHLORIDE: 600; 310; 30; 20 INJECTION, SOLUTION INTRAVENOUS at 21:34

## 2024-06-19 RX ADMIN — SODIUM CHLORIDE, SODIUM LACTATE, POTASSIUM CHLORIDE, AND CALCIUM CHLORIDE: 600; 310; 30; 20 INJECTION, SOLUTION INTRAVENOUS at 11:19

## 2024-06-19 RX ADMIN — MONTELUKAST 10 MG: 10 TABLET, FILM COATED ORAL at 21:29

## 2024-06-19 RX ADMIN — METAXALONE 800 MG: 800 TABLET ORAL at 15:36

## 2024-06-19 RX ADMIN — GLYCOPYRROLATE 0.1 MG: 0.2 INJECTION INTRAMUSCULAR; INTRAVENOUS at 10:37

## 2024-06-19 RX ADMIN — SODIUM CHLORIDE, SODIUM LACTATE, POTASSIUM CHLORIDE, AND CALCIUM CHLORIDE: 600; 310; 30; 20 INJECTION, SOLUTION INTRAVENOUS at 09:09

## 2024-06-19 RX ADMIN — MIDAZOLAM 2 MG: 1 INJECTION INTRAMUSCULAR; INTRAVENOUS at 09:43

## 2024-06-19 RX ADMIN — PROPOFOL 200 MG: 10 INJECTION, EMULSION INTRAVENOUS at 10:35

## 2024-06-19 RX ADMIN — WATER 2000 MG: 1 INJECTION INTRAMUSCULAR; INTRAVENOUS; SUBCUTANEOUS at 18:48

## 2024-06-19 RX ADMIN — DEXMEDETOMIDINE HYDROCHLORIDE 20 MCG: 100 INJECTION, SOLUTION INTRAVENOUS at 09:49

## 2024-06-19 RX ADMIN — Medication 50 MCG: at 10:53

## 2024-06-19 RX ADMIN — Medication 100 MCG: at 13:22

## 2024-06-19 RX ADMIN — Medication 100 MCG: at 10:56

## 2024-06-19 RX ADMIN — Medication 5 MG: at 10:45

## 2024-06-19 RX ADMIN — ACETAMINOPHEN 1000 MG: 500 TABLET ORAL at 10:07

## 2024-06-19 RX ADMIN — WATER 2000 MG: 1 INJECTION INTRAMUSCULAR; INTRAVENOUS; SUBCUTANEOUS at 10:53

## 2024-06-19 RX ADMIN — Medication 10 MG: at 10:50

## 2024-06-19 RX ADMIN — Medication 10 MG: at 11:42

## 2024-06-19 RX ADMIN — ACETAMINOPHEN 650 MG: 325 TABLET ORAL at 21:29

## 2024-06-19 RX ADMIN — Medication 10 MG: at 10:56

## 2024-06-19 RX ADMIN — SODIUM CHLORIDE, SODIUM LACTATE, POTASSIUM CHLORIDE, AND CALCIUM CHLORIDE: 600; 310; 30; 20 INJECTION, SOLUTION INTRAVENOUS at 13:15

## 2024-06-19 RX ADMIN — POLYETHYLENE GLYCOL 3350 17 G: 17 POWDER, FOR SOLUTION ORAL at 15:37

## 2024-06-19 RX ADMIN — Medication 10 MG: at 10:48

## 2024-06-19 RX ADMIN — CETIRIZINE HYDROCHLORIDE 10 MG: 10 TABLET, FILM COATED ORAL at 15:37

## 2024-06-19 RX ADMIN — FENTANYL CITRATE 100 MCG: 50 INJECTION, SOLUTION INTRAMUSCULAR; INTRAVENOUS at 09:43

## 2024-06-19 RX ADMIN — HYDROMORPHONE HYDROCHLORIDE 0.5 MG: 1 INJECTION, SOLUTION INTRAMUSCULAR; INTRAVENOUS; SUBCUTANEOUS at 11:10

## 2024-06-19 RX ADMIN — DEXAMETHASONE SODIUM PHOSPHATE 4 MG: 10 INJECTION, SOLUTION INTRAMUSCULAR; INTRAVENOUS at 09:49

## 2024-06-19 RX ADMIN — DEXAMETHASONE SODIUM PHOSPHATE 8 MG: 4 INJECTION, SOLUTION INTRAMUSCULAR; INTRAVENOUS at 11:03

## 2024-06-19 RX ADMIN — Medication 15 MG: at 10:51

## 2024-06-19 RX ADMIN — Medication 50 MCG: at 12:35

## 2024-06-19 RX ADMIN — LIDOCAINE HYDROCHLORIDE 100 MG: 20 INJECTION, SOLUTION EPIDURAL; INFILTRATION; INTRACAUDAL; PERINEURAL at 10:35

## 2024-06-19 ASSESSMENT — PAIN SCALES - GENERAL
PAINLEVEL_OUTOF10: 8
PAINLEVEL_OUTOF10: 2
PAINLEVEL_OUTOF10: 8
PAINLEVEL_OUTOF10: 5
PAINLEVEL_OUTOF10: 4
PAINLEVEL_OUTOF10: 0
PAINLEVEL_OUTOF10: 8
PAINLEVEL_OUTOF10: 0
PAINLEVEL_OUTOF10: 0
PAINLEVEL_OUTOF10: 7
PAINLEVEL_OUTOF10: 8
PAINLEVEL_OUTOF10: 0

## 2024-06-19 ASSESSMENT — PAIN DESCRIPTION - PAIN TYPE
TYPE: SURGICAL PAIN

## 2024-06-19 ASSESSMENT — PAIN DESCRIPTION - ORIENTATION
ORIENTATION: LEFT

## 2024-06-19 ASSESSMENT — PAIN DESCRIPTION - FREQUENCY
FREQUENCY: CONTINUOUS
FREQUENCY: INTERMITTENT

## 2024-06-19 ASSESSMENT — PAIN DESCRIPTION - DESCRIPTORS
DESCRIPTORS: ACHING;SORE
DESCRIPTORS: SORE
DESCRIPTORS: SORE
DESCRIPTORS: ACHING;SORE
DESCRIPTORS: ACHING;SORE
DESCRIPTORS: ACHING
DESCRIPTORS: ACHING

## 2024-06-19 ASSESSMENT — PAIN DESCRIPTION - ONSET
ONSET: ON-GOING
ONSET: ON-GOING

## 2024-06-19 ASSESSMENT — PAIN DESCRIPTION - LOCATION
LOCATION: KNEE

## 2024-06-19 NOTE — OP NOTE
ndications: This is a 59 yrs female who presents with left knee pain and mobility impairment.  The patient was admitted for surgery as conservative measures have failed.    Date of Surgery: [unfilled]     Preoperative Diagnosis:  Osteoarthritis of left knee, unspecified osteoarthritis type [M17.12]    Postoperative Diagnosis: same    Surgeon: Chau Banuelos MD    Assistant: Kleber Lujan DO    Anesthesia: General    Procedure: [unfilled]    Procedure Details:  Compartmented  Patient was brought to the operating room and positioned on the operating table. Patient was anethestized with regional adductor canal block and general anesthesia.  IV antibiotics were administered.  A pneumatic tourniquet was placed about the limb and the left leg was prepped and draped in the usual sterile manner.  The tourniquet was NOT inflated.  An anterior longitudinal incision was accomplished just medial to the tibial tubercle and extending approximal 6 centimeters proximal to the superior pole of the patella.  A subvastus incision was performed. The medial capsular flap was elevated around to the insertion of the semimembranous tendon.  The patella was retracted laterally and the knee flexed and externally rotated.       The femoral array pins were placed intraincisional and the tibial array pins were placed extraincisional.  The femoral and tibial reference checkpoints were placed in the anteromedial femur and anterior tibia.      The hip center of rotation was confirmed.     The medial and lateral malleolar reference points were captured with the green probe.   The distal femoral and proximal tibial checkpoints were verified.       30 subchondral points about the femur and tibia, respectively, were captured with the blue probe.  These were then assessed with the BISHOP software and confirmed to reference the CT overlay with native bone.   The verification spheres were then tested to ensure overlay in all three planes.  The

## 2024-06-19 NOTE — INTERVAL H&P NOTE
Update History & Physical    The patient's History and Physical of June 6, 2024 was reviewed with the patient and I examined the patient. There was no change. The surgical site was confirmed by the patient and me.     Plan: The risks, benefits, expected outcome, and alternative to the recommended procedure have been discussed with the patient. Patient understands and wants to proceed with the procedure.     Electronically signed by Chau Banuelos MD on 6/19/2024 at 9:35 AM

## 2024-06-19 NOTE — PERIOP NOTE
Reviewed PTA medication list with patient/caregiver and patient/caregiver denies any additional medications.     Patient admits to having a responsible adult care for them at home for at least 24 hours after surgery.    Patient encouraged to use gown warming system and informed that using said warming gown to regulate body temperature prior to a procedure has been shown to help reduce the risks of blood clots and infection.    Patient's pharmacy of choice verified and documented in PTA medication section.    Dual skin assessment & fall risk band verification completed with A memo RAJAN.

## 2024-06-19 NOTE — ANESTHESIA POSTPROCEDURE EVALUATION
Post-Anesthesia Evaluation and Assessment    Cardiovascular Function/Vital Signs  Visit Vitals  /68   Pulse 95   Temp 97.2 °F (36.2 °C) (Temporal)   Resp 19   Ht 1.651 m (5' 5\")   Wt 89.4 kg (197 lb 1.6 oz)   SpO2 100%   BMI 32.80 kg/m²       Patient is status post Procedure(s):  TOTAL LEFT KNEE ARTHROPLASTY BISHOP WITH C-ARM.    Nausea/Vomiting: Controlled.    Postoperative hydration reviewed and adequate.    Pain:      Managed.    Neurological Status:       At baseline.    Mental Status and Level of Consciousness: Arousable.    Pulmonary Status:       Adequate oxygenation and airway patent.    Complications related to anesthesia: None    Patient has met all discharge requirements.    Signed By: Jaxson Dixon MD    June 19, 2024             Post-Anesthesia Evaluation and Assessment    Cardiovascular Function/Vital Signs  Visit Vitals  /68   Pulse 95   Temp 97.2 °F (36.2 °C) (Temporal)   Resp 19   Ht 1.651 m (5' 5\")   Wt 89.4 kg (197 lb 1.6 oz)   SpO2 100%   BMI 32.80 kg/m²       Patient is status post Procedure(s):  TOTAL LEFT KNEE ARTHROPLASTY BISHOP WITH C-ARM.    Nausea/Vomiting: Controlled.    Postoperative hydration reviewed and adequate.    Pain:      Managed.    Neurological Status:       At baseline.    Mental Status and Level of Consciousness: Arousable.    Pulmonary Status:       Adequate oxygenation and airway patent.    Complications related to anesthesia: None    Patient has met all discharge requirements.    Signed By: Jaxson Dixon MD    June 19, 2024

## 2024-06-19 NOTE — ANESTHESIA PROCEDURE NOTES
Peripheral Block    Patient location during procedure: pre-op  Reason for block: post-op pain management and at surgeon's request  Start time: 6/19/2024 9:43 AM  End time: 6/19/2024 9:51 AM  Staffing  Performed: anesthesiologist   Anesthesiologist: Pablo Acevedo DO  Performed by: Pablo Acevedo DO  Authorized by: Pablo Acevedo DO    Preanesthetic Checklist  Completed: patient identified, IV checked, site marked, risks and benefits discussed, surgical/procedural consents, equipment checked, pre-op evaluation, timeout performed, anesthesia consent given, oxygen available, monitors applied/VS acknowledged, fire risk safety assessment completed and verbalized and blood product R/B/A discussed and consented  Peripheral Block   Patient position: supine (frog leg)  Prep: ChloraPrep  Provider prep: mask and sterile gloves  Patient monitoring: cardiac monitor, continuous pulse ox, frequent blood pressure checks, IV access, responsive to questions and oxygen  Block type: Femoral  Adductor canal  Laterality: left  Injection technique: single-shot  Guidance: ultrasound guided    Needle   Needle type: insulated echogenic nerve stimulator needle   Needle gauge: 21 G  Needle localization: ultrasound guidance  Needle length: 8 cm  Assessment   Injection assessment: negative aspiration for heme, no paresthesia on injection, local visualized surrounding nerve on ultrasound and no intravascular symptoms  Paresthesia pain: none  Slow fractionated injection: yes  Hemodynamics: stable  Outcomes: uncomplicated and patient tolerated procedure well

## 2024-06-19 NOTE — PERIOP NOTE
Notified A Sp RN in holding that pt states she completed antibiotic  treatment for UTI 2 weeks ago. She states she will notify Dr. Banuelos prior to procedure. Pt unsure what antibiotic she took. Patient denies any symptoms of UTI day of surgery.

## 2024-06-20 VITALS
DIASTOLIC BLOOD PRESSURE: 78 MMHG | HEIGHT: 65 IN | WEIGHT: 197.1 LBS | RESPIRATION RATE: 18 BRPM | HEART RATE: 69 BPM | TEMPERATURE: 98 F | SYSTOLIC BLOOD PRESSURE: 131 MMHG | BODY MASS INDEX: 32.84 KG/M2 | OXYGEN SATURATION: 100 %

## 2024-06-20 PROBLEM — M17.12 UNILATERAL PRIMARY OSTEOARTHRITIS, LEFT KNEE: Status: RESOLVED | Noted: 2024-06-19 | Resolved: 2024-06-20

## 2024-06-20 LAB
ANION GAP SERPL CALC-SCNC: 5 MMOL/L (ref 3–18)
BUN SERPL-MCNC: 14 MG/DL (ref 7–18)
BUN/CREAT SERPL: 16 (ref 12–20)
CALCIUM SERPL-MCNC: 9.1 MG/DL (ref 8.5–10.1)
CHLORIDE SERPL-SCNC: 106 MMOL/L (ref 100–111)
CO2 SERPL-SCNC: 30 MMOL/L (ref 21–32)
CREAT SERPL-MCNC: 0.85 MG/DL (ref 0.6–1.3)
GLUCOSE SERPL-MCNC: 111 MG/DL (ref 74–99)
HCT VFR BLD AUTO: 32.4 % (ref 35–45)
HGB BLD-MCNC: 10.3 G/DL (ref 12–16)
POTASSIUM SERPL-SCNC: 4.8 MMOL/L (ref 3.5–5.5)
SODIUM SERPL-SCNC: 141 MMOL/L (ref 136–145)

## 2024-06-20 PROCEDURE — 6370000000 HC RX 637 (ALT 250 FOR IP): Performed by: ORTHOPAEDIC SURGERY

## 2024-06-20 PROCEDURE — 2580000003 HC RX 258: Performed by: ORTHOPAEDIC SURGERY

## 2024-06-20 PROCEDURE — 85018 HEMOGLOBIN: CPT

## 2024-06-20 PROCEDURE — 97116 GAIT TRAINING THERAPY: CPT

## 2024-06-20 PROCEDURE — 97165 OT EVAL LOW COMPLEX 30 MIN: CPT

## 2024-06-20 PROCEDURE — 36415 COLL VENOUS BLD VENIPUNCTURE: CPT

## 2024-06-20 PROCEDURE — 85014 HEMATOCRIT: CPT

## 2024-06-20 PROCEDURE — 6360000002 HC RX W HCPCS: Performed by: ORTHOPAEDIC SURGERY

## 2024-06-20 PROCEDURE — 80048 BASIC METABOLIC PNL TOTAL CA: CPT

## 2024-06-20 PROCEDURE — 97535 SELF CARE MNGMENT TRAINING: CPT

## 2024-06-20 RX ORDER — DOCUSATE SODIUM 100 MG/1
100 CAPSULE, LIQUID FILLED ORAL 2 TIMES DAILY PRN
Qty: 28 CAPSULE | Refills: 0
Start: 2024-06-20 | End: 2024-07-04

## 2024-06-20 RX ORDER — OXYCODONE HYDROCHLORIDE AND ACETAMINOPHEN 5; 325 MG/1; MG/1
1 TABLET ORAL EVERY 4 HOURS PRN
Qty: 50 TABLET | Refills: 0
Start: 2024-06-20 | End: 2024-07-04

## 2024-06-20 RX ORDER — ASPIRIN 325 MG
325 TABLET, DELAYED RELEASE (ENTERIC COATED) ORAL DAILY
Qty: 30 TABLET | Refills: 0
Start: 2024-06-20 | End: 2024-07-20

## 2024-06-20 RX ADMIN — ASPIRIN 325 MG: 325 TABLET, COATED ORAL at 08:16

## 2024-06-20 RX ADMIN — WATER 2000 MG: 1 INJECTION INTRAMUSCULAR; INTRAVENOUS; SUBCUTANEOUS at 03:04

## 2024-06-20 RX ADMIN — VENLAFAXINE HYDROCHLORIDE 75 MG: 75 CAPSULE, EXTENDED RELEASE ORAL at 08:16

## 2024-06-20 RX ADMIN — ASPIRIN 325 MG: 325 TABLET, COATED ORAL at 03:05

## 2024-06-20 RX ADMIN — ACETAMINOPHEN 650 MG: 325 TABLET ORAL at 08:16

## 2024-06-20 RX ADMIN — ACETAMINOPHEN 650 MG: 325 TABLET ORAL at 03:05

## 2024-06-20 RX ADMIN — OXYCODONE HYDROCHLORIDE 10 MG: 5 TABLET ORAL at 03:04

## 2024-06-20 RX ADMIN — OXYCODONE HYDROCHLORIDE 10 MG: 5 TABLET ORAL at 10:54

## 2024-06-20 RX ADMIN — FLUTICASONE PROPIONATE 1 SPRAY: 50 SPRAY, METERED NASAL at 08:15

## 2024-06-20 ASSESSMENT — PAIN DESCRIPTION - DESCRIPTORS
DESCRIPTORS: SORE
DESCRIPTORS: SORE

## 2024-06-20 ASSESSMENT — PAIN DESCRIPTION - LOCATION
LOCATION: KNEE

## 2024-06-20 ASSESSMENT — PAIN DESCRIPTION - PAIN TYPE
TYPE: SURGICAL PAIN

## 2024-06-20 ASSESSMENT — PAIN SCALES - GENERAL
PAINLEVEL_OUTOF10: 6
PAINLEVEL_OUTOF10: 5
PAINLEVEL_OUTOF10: 9

## 2024-06-20 ASSESSMENT — PAIN DESCRIPTION - FREQUENCY: FREQUENCY: CONTINUOUS

## 2024-06-20 ASSESSMENT — PAIN DESCRIPTION - ORIENTATION
ORIENTATION: LEFT

## 2024-06-20 ASSESSMENT — PAIN DESCRIPTION - ONSET: ONSET: ON-GOING

## 2024-06-20 NOTE — PROGRESS NOTES
Physical Therapy Goals:  Initiated 6/20/2024 to be met within 7-10 days.       [x]  Patient has met MD kayleen coombs for d/c home   []  Recommend HH with 24 hour adult care   []  Benefit from additional acute PT session to address:        PHYSICAL THERAPY TREATMENT    Patient: Paige Barahona (59 y.o. female)  Date: 6/20/2024  Diagnosis: Osteoarthritis of left knee, unspecified osteoarthritis type [M17.12]  Unilateral primary osteoarthritis, left knee [M17.12] Unilateral primary osteoarthritis, left knee  Procedure(s) (LRB):  TOTAL LEFT KNEE ARTHROPLASTY BISHOP WITH C-ARM (Left) 1 Day Post-Op  Precautions: Fall Risk, Weight Bearing,  , Left Lower Extremity Weight Bearing: Weight Bearing As Tolerated,  ,  ,  ,  ,    PLOF: independent, ambulatory without AD, has a RW    ASSESSMENT:  Assessment  Assessment: Pt sitting up EOB with nurse present to go to the bathroom.  Pt performed sit to stand without difficulty.  Ambulated with RW to the bathroom, voided, independent with clothing and hygiene.  Ambulated 120ft in the soto with RW, step to gt pattern, steady slow pace, no LOB or knee buckling.  Negotiated 5 steps holding (B) hand rails.  Returned to room and into bed without assistance.  Ice and ambulation at home.  Activity Tolerance: Patient tolerated treatment well  Discharge Recommendations: Outpatient PT (per Dr flores)    Progression toward goals:   [x]      Improving appropriately and progressing toward goals  []      Improving slowly and progressing toward goals  []      Not making progress toward goals and plan of care will be adjusted     PLAN:  Patient continues to benefit from skilled intervention to address the above impairments.  Continue treatment per established plan of care.    Further Equipment Recommendations for Discharge:   Discharge Recommendation: Discharge Recommendations: Outpatient PT (per Dr flores)    [facility]: 20/24    This [facility] score should be considered in conjunction with 
0705: Bedside report received from Geno RN (offgoing nurse) to SATINDER Clark (oncoming nurse), bedside report included nurse Handoff Report, MAR, & labs, Pt observed resting in bed at safe position, Pt without any issues. Call light within reach, ongoing monitoring in place.    0815: Shift assessment completed, Pt stable at this moment, no s/sx of distress, A&O x4, neuro WDL, chest rise and fall equally, no SOB noted, HOB elevated, LS CTA in all lobes bilaterally A&P, IS use encouraged, HS S1S2 stable & reg, ABD is soft, non-distended & non-tender, Bs x4 normoactive, +2 palpable peripheral pulses bilaterally in all extremities, + CSM, Ace wrap dressing to LLE is C/D/I, 20g IV noted to Lt Hand is patent & intact, LR infusing @ 125 ml/hr w/o complications, transparent dressing in C/D/I, pain rated 6/10, ice applied to site, Pt educated on importance of pain control & ambulation, Is education provided, Pt verbalized understanding, Pt resting in bed at safe position & call bell within reach, ongoing monitoring in place.      1230: AVS meds reviewed with SATINDER Ac    1240: D/c education provided to Pt, no questions or concerns voiced, Optifoam applied to Lt kne with Ace wrap, education proved r/t care of Lt Kne, Pt verbalized understanding, Pt stable prior to d/c, IV removed  
1424: Pt arrived from PACU to [address] [place] [address], Bedside report received from SATINDER Weber (offgoing nurse) to SATINDER Clark (oncoming nurse), bedside report included nurse Handoff Report, MAR, & SBAR, Pt observed resting in bed at safe position, Pt without any issues. Call light within reach, ongoing monitoring in place.    1445: Shift assessment completed, Pt stable at this moment, no s/sx of distress, A&O x4, neuro WDL, chest rise and fall equally, no SOB noted, HOB elevated, LS CTA in all lobes bilaterally A&P, IS use encouraged, HS S1S2 stable & reg, ABD is soft, non-distended & non-tender, Bs x4 normoactive, +2 palpable peripheral pulses bilaterally in all extremities, + CSM, Ace wrap dressing to LLE is C/D/I, 20g IV noted to Lt Hand is patent & intact, LR infusing @ 125 ml/hr w/o complications, transparent dressing in C/D/I, pain rated 7/10, ice applied to site, Pt educated on importance of pain control & ambulation, Is education provided, Pt verbalized understanding, Pt resting in bed at safe position & call bell within reach, ongoing monitoring in place.      1536: PRN Skelaxin administered along with scheduled tylenol for pain 8/10, Pt currently working with PT.     1630: MD paged for PRN pain medications for moderate/mild/severe pain; Per OR nurse, MD is currently in surgery and will address order when done,     1635: Pt rates pain 8/10 to Lt knee, ice applied to site and LLE elevated     1930: Pt's Ace dressing to LLE noted with increased breakthrough drainage, LLE rewrapped with ABD, Kerlix & Ace wrap applied. Xeroform left in place, incision was not touched, Pt stable resting in bed at safe position, call bell within reach, ongoing monitoring in place.     1940: Report given to SATINDER Lora (oncoming nurse) by SATINDER Clark (offoing nurse), bedside report included Nurse Handoff Report, MAR, & labs.    1943: MD notified r/t saturated ace wrap, no new orders received      
1940- Bedside and Verbal shift change report given to SATINDER Lora (oncoming nurse) by SATINDER Clrak (offgoing nurse). Report included the following information Nurse Handoff Report, Adult Overview, Surgery Report, Intake/Output, MAR, and Recent Results.      2127 - Patient A&Ox4, RA. Denies chest pain and SOB. With ace wrap dressing to L knee C/D/I.  Denies numbness/tingling/calf pain.  Pain 8/10 with a tolerable level of 3/10. With sequential compression device bilaterally. Pt educated on unit routine, IS use, q2h rounds, and pain management. Pt verbalized understanding, no concerns voiced. Call bell and telephone within reach, bed in lowest position. Pt encouraged to call for assistance via call bell/zone phone.     0257- Pt ambulated to the hallway with steady gait.    0705- Bedside and Verbal shift change report given to SATINDER Clark (oncoming nurse) by SATINDER Lora (offgoing nurse). Report included the following information Nurse Handoff Report, Adult Overview, Surgery Report, Intake/Output, MAR, and Recent Results.    
TRANSFER - IN REPORT:    Verbal report received from RN,CRNA  on Paige Barahona  being received from OR for routine post-op      Report consisted of patient's Situation, Background, Assessment and   Recommendations(SBAR).     Information from the following report(s) Nurse Handoff Report was reviewed with the receiving nurse.    Opportunity for questions and clarification was provided.      Assessment completed upon patient's arrival to unit and care assumed.     
TRANSFER - OUT REPORT:    Verbal report given to SATINDER Clark  on Paige Barahona  being transferred to [address] [place] [address]  for routine progression of patient care       Report consisted of patient's Situation, Background, Assessment and   Recommendations(SBAR).     Information from the following report(s) Nurse Handoff Report, Adult Overview, Surgery Report, Intake/Output, and MAR was reviewed with the receiving nurse.           Lines:   Peripheral IV 06/19/24 Left;Posterior Hand (Active)   Site Assessment Clean, dry & intact 06/19/24 1406   Line Status Infusing 06/19/24 1406   Line Care Connections checked and tightened 06/19/24 1406   Phlebitis Assessment No symptoms 06/19/24 1406   Infiltration Assessment 0 06/19/24 1406   Alcohol Cap Used No 06/19/24 1406   Dressing Status Clean, dry & intact 06/19/24 1406   Dressing Type Transparent 06/19/24 1406   Dressing Intervention New 06/19/24 1406        Opportunity for questions and clarification was provided.      Patient transported with:  Registered Nurse        
Training:  Gait  Gait Training: Yes  Left Side Weight Bearing: As tolerated  Overall Level of Assistance: Contact-guard assistance;Additional time  Distance (ft): 12 Feet  Assistive Device: Gait belt;Walker, rolling  Interventions: Safety awareness training;Tactile cues;Verbal cues  Base of Support: Shift to right  Speed/Liseth: Slow  Step Length: Left shortened;Right shortened  Swing Pattern: Left asymmetrical;Right asymmetrical  Stance: Left decreased  Gait Abnormalities: Antalgic;Decreased step clearance;Step to gait  Therapeutic Exercises/Neuromuscular Re-education:   Pain:  Pain level pre-treatment: 8/10   Pain level post-treatment: 8/10   Pain Intervention(s): Medication (see MAR); Rest, Ice, Repositioning  Response to intervention: Nurse notified     Activity Tolerance:   Activity Tolerance: Patient tolerated evaluation without incident  Please refer to the flowsheet for vital signs taken during this treatment.    After treatment:   []         Patient left in no apparent distress sitting up in chair  [x]         Patient left in no apparent distress in bed  [x]         Call bell left within reach  [x]         Nursing notified  [x]         Caregiver present  []         Bed alarm activated  [x]         SCDs applied    COMMUNICATION/EDUCATION:   Patient Education  Education Given To: Patient;Caregiver  Education Provided: Role of Therapy;Plan of Care;Transfer Training;Fall Prevention Strategies  Education Method: Demonstration;Verbal;Teach Back  Education Outcome: Verbalized understanding;Demonstrated understanding;Continued education needed    Thank you for this referral.  Nicki Velásquez, PT  Minutes: 48      Eval Complexity: Decision Making: Low Complexity    
understanding    Thank you for this referral.  Paz Resendiz, SANDY, OTR/L  Minutes: 27    Eval Complexity: Decision Making: Low Complexity

## 2024-06-20 NOTE — PLAN OF CARE
Problem: Pain  Goal: Verbalizes/displays adequate comfort level or baseline comfort level  6/20/2024 0927 by Amber Smith RN  Outcome: Progressing  6/20/2024 0629 by Geno Mendez RN  Outcome: Progressing     Problem: Safety - Adult  Goal: Free from fall injury  6/20/2024 0927 by Amber Smith RN  Outcome: Progressing  6/20/2024 0629 by Geno Mendez RN  Outcome: Progressing     Problem: Discharge Planning  Goal: Discharge to home or other facility with appropriate resources  6/20/2024 0927 by Amber Smith RN  Outcome: Progressing  6/20/2024 0629 by Geno Mendez RN  Outcome: Progressing     Problem: ABCDS Injury Assessment  Goal: Absence of physical injury  6/20/2024 0927 by Amber Smith RN  Outcome: Progressing  6/20/2024 0629 by Geno Mendez RN  Outcome: Progressing

## 2024-06-20 NOTE — CARE COORDINATION
Discharge Plan: Home    CM NW met with patient at bedside. Pt has outpatient PT arranged starting June 21, follow up appt with Dr. Banuelos July 11, and post op pain medication prescribed. Family to provide transport at discharge. No discharge need identified. CM available if discharge needs arise.

## 2024-06-20 NOTE — DISCHARGE INSTRUCTIONS
Total Knee Replacement Discharge Instructions    ACTIVITIES :  Do not sit for more than 1 hour at a time while you are awake. Walking is the best way to recover after knee replacement.    Do the exercises your Physical Therapist tells you to do at least twice a day.   Use your walker until your Physical Therapist tells you it is safe to stop.   Sit in chairs with arms.   Rest when you feel tired. You may take a nap, but don't stay in bed all day.   Avoid jogging, running, kneeling, vacuuming, carrying heavy bags, or bending over.  Ask Dr. Banuelos when it is safe to have sex.      BATHING and INCISION CARE:  Change the dressing to the optifoam on the second postoperative day.    Keep the ace wrap clean and dry.  The incision is closed with staples that can't get wet.   No showering until after your first post op checkup.   Bath off daily using dial soap.   Call Dr. Banuelos if the dressings get wet, soiled, has drainage, or comes loose.        ICE and ELEVATION:  Use ice 20 minutes on and off to ease swelling and pain.   Elevate your leg with the knee straight when resting.  Toes above Nose  Do not use heat on your leg.   Ice and elevate as long as you have pain and swelling.     MEDICATIONS -  Daily Medications:  Resume the medications you were taking before surgery (unless instructed by your doctor not to).   You may want to wait on taking herbal supplements, vitamins,  and over the counter medication the first few days after surgery.   Blood Thinner Medication:   Take your blood thinner medication as prescribed.    DO NOT skip a dose.   Remember you will bruise and bleed easier while taking this medication to prevent blood clots.   Narcotic Pain Medication:  Take the narcotic pain medication as prescribed.   Do not take more than prescribed.    Eat before taking the narcotic pain medication (even in the middle of the night).   Call Dr. Banuelos if the pain medication is not helping you achieve pain

## 2024-06-20 NOTE — DISCHARGE SUMMARY
Admit date: 6/19/2024   Admitting Provider: Chau Banuelos MD    Discharge date: 6/20/2024  Discharging Provider: Chau Banuelos MD      * Admission Diagnoses: Osteoarthritis of left knee, unspecified osteoarthritis type [M17.12]  Unilateral primary osteoarthritis, left knee [M17.12]    * Discharge Diagnoses:  s/p L TKA    * Hospital Course: Patient was admitted s/p left TKA.  Patient was able to participate in PT on DOS.  She required a dressing change due to post op bleeding.  Her vital signs were stable.  On POD #1 patient's pain remained well controlled and patient was able to ambulate with assistance.  Patient was able to tolerate a regular diet and was able to void on their own.     On exam POD #1 patient was alert and oriented x 3.  They appeared comfortable.  Breathing was non labored.  The dressings were clean and dry and there were no focal neurovascular deficits.      Labs and post op imaging was reviewed.       Discharge instructions were provided.    * Procedures:   Procedure(s):  TOTAL LEFT KNEE ARTHROPLASTY BISHOP   Consults: PT/OT    Significant Diagnostic Studies:   Recent Results (from the past 24 hour(s))   TYPE AND SCREEN    Collection Time: 06/19/24  9:02 AM   Result Value Ref Range    Crossmatch expiration date 06/22/2024,2359     ABO/Rh B POSITIVE     Antibody Screen NEG    Basic Metabolic Panel    Collection Time: 06/20/24  4:43 AM   Result Value Ref Range    Sodium 141 136 - 145 mmol/L    Potassium 4.8 3.5 - 5.5 mmol/L    Chloride 106 100 - 111 mmol/L    CO2 30 21 - 32 mmol/L    Anion Gap 5 3.0 - 18 mmol/L    Glucose 111 (H) 74 - 99 mg/dL    BUN 14 7.0 - 18 MG/DL    Creatinine 0.85 0.6 - 1.3 MG/DL    BUN/Creatinine Ratio 16 12 - 20      Est, Glom Filt Rate 79 >60 ml/min/1.73m2    Calcium 9.1 8.5 - 10.1 MG/DL   Hemoglobin and Hematocrit    Collection Time: 06/20/24  4:43 AM   Result Value Ref Range    Hemoglobin 10.3 (L) 12.0 - 16.0 g/dL    Hematocrit 32.4 (L) 35.0 - 45.0 %          *

## 2024-06-21 ENCOUNTER — HOSPITAL ENCOUNTER (OUTPATIENT)
Facility: HOSPITAL | Age: 59
Setting detail: RECURRING SERIES
Discharge: HOME | End: 2024-06-24
Payer: OTHER GOVERNMENT

## 2024-06-21 PROCEDURE — 97110 THERAPEUTIC EXERCISES: CPT

## 2024-06-21 PROCEDURE — 97530 THERAPEUTIC ACTIVITIES: CPT

## 2024-06-21 PROCEDURE — 97016 VASOPNEUMATIC DEVICE THERAPY: CPT

## 2024-06-21 PROCEDURE — 97535 SELF CARE MNGMENT TRAINING: CPT

## 2024-06-21 NOTE — PROGRESS NOTES
[facility]  [address] [place] [address]  Ph [phone]  Fx [phone]      Continued Plan of Care/ Re-certification for Physical Therapy Services    Patient name: Paige Barahona Start of Care: 2024   Referral source: Chau Banuelos MD : 1965               Medical Diagnosis: Pain in left knee [M25.562]    Onset Date:2020               Treatment Diagnosis: M25.562  LEFT KNEE PAIN      Prior Hospitalization: see medical history Provider#: [id number]   Medications: Verified on Patient summary List   Comorbidities: OA, chronic bilateral knee pain, s/p bilateral knee scopes ( and ), hiatal hernia repair, tummy tuck, breast augmentation  Substance Use: [] tobacco use, [x] alcohol use, [] other:   Patients Self-Rated Overall Health Status: [] poor, [] fair, [x] good, [] excellent  Number of Falls Within the Past Year: [x] none, []   Prior Level of Function: active with working out 4x/week, gradually decreasing difficulty with running, squatting, and jumping over the last 3 years  [] Unrestricted with functional activities and ADL's  [x] No assistive device  [x] active lifestyle, [] moderately active lifestyle, [] sedentary lifestyle  Patients Goals:  \"Pain relief and full motion and function.\"    Visits from Start of Care: 2    Missed Visits: 0    Reporting Period: 24 to 24    The Plan of Care and following information is based on the patient's current status:    Key functional changes: patient is now s/p left TKR on 24 (2 days post-op)      Problems/ barriers to goal attainment: pain affecting function, decrease ROM, decrease strength, edema affection function, impaired gait/balance, decrease ADL/functional abilities, decrease activity tolerance, decrease flexibility/joint mobility, and decrease transfer abilities     Problem List: pain affecting function, decrease ROM, decrease strength, edema affection function, impaired gait/balance, 
MIHPTRC MI

## 2024-06-24 ENCOUNTER — HOSPITAL ENCOUNTER (OUTPATIENT)
Facility: HOSPITAL | Age: 59
Setting detail: RECURRING SERIES
Discharge: HOME | End: 2024-06-27
Payer: OTHER GOVERNMENT

## 2024-06-24 PROCEDURE — 97110 THERAPEUTIC EXERCISES: CPT

## 2024-06-24 PROCEDURE — 97112 NEUROMUSCULAR REEDUCATION: CPT

## 2024-06-24 PROCEDURE — 97016 VASOPNEUMATIC DEVICE THERAPY: CPT

## 2024-06-24 PROCEDURE — 97530 THERAPEUTIC ACTIVITIES: CPT

## 2024-06-24 PROCEDURE — 97535 SELF CARE MNGMENT TRAINING: CPT

## 2024-06-24 NOTE — PROGRESS NOTES
PHYSICAL / OCCUPATIONAL THERAPY - DAILY TREATMENT NOTE     Patient Name: Paige Barahona    Date: 2024    : 1965  Insurance: Payor:  EAST / Plan:  EAST PRIME / Product Type: *No Product type* /      Patient  verified  Yes      Visit #   Current / Total 3 35   Time   In / Out 1000 1104   Pain   In / Out 6/10 8/10   Subjective Functional Status/Changes: Pt reported that she had trouble with the edema wear up at her thigh and had to roll it down   Changes to:  Allergies, Med Hx, Sx Hx?   no       TREATMENT AREA =  Pain in left knee [M25.562]    OBJECTIVE    Modalities Rationale:     decrease edema, decrease inflammation, decrease pain, increase tissue extensibility, and increase muscle contraction/control to improve patient's ability to progress to PLOF and address remaining functional goals.    10 min [x]  Vasopneumatic Device, press/temp: Mod/34dgs    If using vaso (only need to measure limb vaso being performed on)      pre-treatment girth : 45      post-treatment girth : 42      measured at (landmark location) :  patella     min []  Other:    Skin assessment post-treatment (if applicable):    []  intact    []  redness- no adverse reaction                 []redness - adverse reaction:         Therapeutic Procedures:  Tx Min Billable or 1:1 Min (if diff from Tx Min) Procedure, Rationale, Specifics   17 17 [id number] Therapeutic Exercise (timed):  increase ROM, strength, coordination, balance, and proprioception to improve patient's ability to progress to PLOF and address remaining functional goals. (see flow sheet as applicable)    Details if applicable:       15 15 [id number] Therapeutic Activity (timed):  use of dynamic activities replicating functional movements to increase ROM, strength, coordination, balance, and proprioception in order to improve patient's ability to progress to PLOF and address remaining functional goals.  (see flow sheet as applicable)    Details if applicable:     14 14

## 2024-06-25 ENCOUNTER — HOSPITAL ENCOUNTER (OUTPATIENT)
Facility: HOSPITAL | Age: 59
Setting detail: RECURRING SERIES
Discharge: HOME | End: 2024-06-28
Payer: OTHER GOVERNMENT

## 2024-06-25 PROCEDURE — 97112 NEUROMUSCULAR REEDUCATION: CPT

## 2024-06-25 PROCEDURE — 97530 THERAPEUTIC ACTIVITIES: CPT

## 2024-06-25 PROCEDURE — 97110 THERAPEUTIC EXERCISES: CPT

## 2024-06-25 PROCEDURE — 97016 VASOPNEUMATIC DEVICE THERAPY: CPT

## 2024-06-26 ENCOUNTER — HOSPITAL ENCOUNTER (OUTPATIENT)
Facility: HOSPITAL | Age: 59
Setting detail: RECURRING SERIES
Discharge: HOME | End: 2024-06-29
Payer: OTHER GOVERNMENT

## 2024-06-26 PROCEDURE — 97110 THERAPEUTIC EXERCISES: CPT

## 2024-06-26 PROCEDURE — 97016 VASOPNEUMATIC DEVICE THERAPY: CPT

## 2024-06-26 PROCEDURE — 97530 THERAPEUTIC ACTIVITIES: CPT

## 2024-06-26 PROCEDURE — 97535 SELF CARE MNGMENT TRAINING: CPT

## 2024-06-26 NOTE — PROGRESS NOTES
PHYSICAL / OCCUPATIONAL THERAPY - DAILY TREATMENT NOTE     Patient Name: Paige Barahona    Date: 2024    : 1965  Insurance: Payor:  EAST / Plan:  EAST PRIME / Product Type: *No Product type* /      Patient  verified Yes     Visit #   Current / Total 5 35   Time   In / Out 1:17 2:12   Pain   In / Out 2/10 0/10   Subjective Functional Status/Changes: \"I don't have too much pain in my knee right now.\"   Changes to:  Allergies, Med Hx, Sx Hx?   no       TREATMENT AREA =  Pain in left knee [M25.562]    OBJECTIVE    Modalities Rationale:     decrease edema, decrease inflammation, and decrease pain to improve patient's ability to progress to PLOF and address remaining functional goals.     min [] Estim Unattended, type/location:                                      []  w/ice    []  w/heat    min [] Estim Attended, type/location:                                     []  w/US     []  w/ice    []  w/heat    []  TENS insruct      min []  Mechanical Traction: type/lbs                   []  pro   []  sup   []  int   []  cont    []  before manual    []  after manual    min []  Ultrasound, settings/location:      min []  Iontophoresis w/ dexamethasone, location:                                               []  take home patch       []  in clinic        min  unbilled []  Ice     []  Heat    location/position:     min []  Paraffin,  details:    10 min [x]  Vasopneumatic Device, press/temp: Med/Low    min []  Whirlpool / Fluido:    If using vaso (only need to measure limb vaso being performed on)      pre-treatment girth : 44 cm      post-treatment girth : 43.2 cm      measured at (landmark location) :  Mid-patella    min []  Other:    Skin assessment post-treatment (if applicable):    [x]  intact    []  redness- no adverse reaction                 []redness - adverse reaction:         Therapeutic Procedures:  Tx Min Billable or 1:1 Min (if diff from Tx Min) Procedure, Rationale, Specifics   21 [id number]

## 2024-06-27 ENCOUNTER — HOSPITAL ENCOUNTER (OUTPATIENT)
Facility: HOSPITAL | Age: 59
Setting detail: RECURRING SERIES
Discharge: HOME | End: 2024-06-30
Payer: OTHER GOVERNMENT

## 2024-06-27 PROCEDURE — 97016 VASOPNEUMATIC DEVICE THERAPY: CPT

## 2024-06-27 PROCEDURE — 97530 THERAPEUTIC ACTIVITIES: CPT

## 2024-06-27 PROCEDURE — 97112 NEUROMUSCULAR REEDUCATION: CPT

## 2024-06-27 PROCEDURE — 97110 THERAPEUTIC EXERCISES: CPT

## 2024-06-27 NOTE — PROGRESS NOTES
PHYSICAL / OCCUPATIONAL THERAPY - DAILY TREATMENT NOTE     Patient Name: Paige Barahona    Date: 2024    : 1965  Insurance: Payor:  EAST / Plan:  EAST PRIME / Product Type: *No Product type* /      Patient  verified Yes     Visit #   Current / Total 6 35   Time   In / Out 9:50 10:43   Pain   In / Out 6 4   Subjective Functional Status/Changes: \"I had to sleep in the recliner last night.\"   Changes to:  Allergies, Med Hx, Sx Hx?   no       TREATMENT AREA =  Pain in left knee [M25.562]    OBJECTIVE    Modalities Rationale:     decrease edema, decrease inflammation, and decrease pain to improve patient's ability to progress to PLOF and address remaining functional goals.     min [] Estim Unattended, type/location:                                      []  w/ice    []  w/heat    min [] Estim Attended, type/location:                                     []  w/US     []  w/ice    []  w/heat    []  TENS insruct      min []  Mechanical Traction: type/lbs                   []  pro   []  sup   []  int   []  cont    []  before manual    []  after manual    min []  Ultrasound, settings/location:      min []  Iontophoresis w/ dexamethasone, location:                                               []  take home patch       []  in clinic        min  unbilled []  Ice     []  Heat    location/position:     min []  Paraffin,  details:    10 min [x]  Vasopneumatic Device, press/temp: Med/Low    min []  Whirlpool / Fluido:    If using vaso (only need to measure limb vaso being performed on)      pre-treatment girth : 45.5 cm      post-treatment girth : 44.5 cm      measured at (landmark location) :  Mid-patella    min []  Other:    Skin assessment post-treatment (if applicable):    []  intact    []  redness- no adverse reaction                 []redness - adverse reaction:         Therapeutic Procedures:  Tx Min Billable or 1:1 Min (if diff from Tx Min) Procedure, Rationale, Specifics   60 [id number] Therapeutic

## 2024-06-28 ENCOUNTER — HOSPITAL ENCOUNTER (OUTPATIENT)
Facility: HOSPITAL | Age: 59
Setting detail: RECURRING SERIES
Discharge: HOME | End: 2024-07-01
Payer: OTHER GOVERNMENT

## 2024-06-28 PROCEDURE — 97110 THERAPEUTIC EXERCISES: CPT

## 2024-06-28 PROCEDURE — 97016 VASOPNEUMATIC DEVICE THERAPY: CPT

## 2024-06-28 PROCEDURE — 97112 NEUROMUSCULAR REEDUCATION: CPT

## 2024-06-28 PROCEDURE — 97530 THERAPEUTIC ACTIVITIES: CPT

## 2024-06-28 NOTE — PROGRESS NOTES
PHYSICAL / OCCUPATIONAL THERAPY - DAILY TREATMENT NOTE     Patient Name: Paige Barahona    Date: 2024    : 1965  Insurance: Payor:  EAST / Plan:  EAST PRIME / Product Type: *No Product type* /      Patient  verified Yes     Visit #   Current / Total 7 35   Time   In / Out 9:11 10:13   Pain   In / Out 6 4   Subjective Functional Status/Changes: Pt reports no changes in left knee pain    Changes to:  Allergies, Med Hx, Sx Hx?   no       TREATMENT AREA =  Pain in left knee [M25.562]    OBJECTIVE    Modalities Rationale:     decrease edema, decrease inflammation, and decrease pain to improve patient's ability to progress to PLOF and address remaining functional goals.     min [] Estim Unattended, type/location:                                      []  w/ice    []  w/heat    min [] Estim Attended, type/location:                                     []  w/US     []  w/ice    []  w/heat    []  TENS insruct      min []  Mechanical Traction: type/lbs                   []  pro   []  sup   []  int   []  cont    []  before manual    []  after manual    min []  Ultrasound, settings/location:      min []  Iontophoresis w/ dexamethasone, location:                                               []  take home patch       []  in clinic        min  unbilled []  Ice     []  Heat    location/position:     min []  Paraffin,  details:    10 min [x]  Vasopneumatic Device, press/temp: Med/Low    min []  Whirlpool / Fluido:    If using vaso (only need to measure limb vaso being performed on)      pre-treatment girth : 43.6 cm      post-treatment girth : 42.5 cm      measured at (landmark location) :  Mid-Patella    min []  Other:    Skin assessment post-treatment (if applicable):    [x]  intact    []  redness- no adverse reaction                 []redness - adverse reaction:         Therapeutic Procedures:  Tx Min Billable or 1:1 Min (if diff from Tx Min) Procedure, Rationale, Specifics   49 [id number] Therapeutic

## 2024-07-01 ENCOUNTER — HOSPITAL ENCOUNTER (OUTPATIENT)
Facility: HOSPITAL | Age: 59
Setting detail: RECURRING SERIES
Discharge: HOME | End: 2024-07-04
Payer: OTHER GOVERNMENT

## 2024-07-01 PROCEDURE — 97110 THERAPEUTIC EXERCISES: CPT

## 2024-07-01 PROCEDURE — 97112 NEUROMUSCULAR REEDUCATION: CPT

## 2024-07-01 PROCEDURE — 97530 THERAPEUTIC ACTIVITIES: CPT

## 2024-07-01 PROCEDURE — 97016 VASOPNEUMATIC DEVICE THERAPY: CPT

## 2024-07-01 NOTE — PROGRESS NOTES
post-surgical knee AROM in order to return to prior functional activities and mobility.  Pre-Op Eval: Left Knee AROM: 0-130 degs  Post-Op Reassess: AROM: 16-75 degs, PROM: 14-81 degs   6/21/24, regression as expected as patient is now 2 days s/p TKR     Patient will demonstrate 5/5 strength of bilateral LE's in order to be able to safely perform functional activities and demonstrate improved stability and strength.  Pre-Op Eval: Bilateral LE's grossly 5/5 bilaterally except bilateral hip extension with knee bent 3+/5  Post-Op Reassess: did not retest as patient is s/p TKR 2 days ago, but fair quad contraction noted though (+) extensor lag   6/21/24, regression as expected as patient is now 2 days s/p TKR     Patient will be able to safely ambulate community distances without AD and functional gait mechanics in order to improve overall mobility and return patient to his or her PLOF.  Pre-Op Eval: WNL community distances without AD  Post-Op Reassess: household distances with FWW with antalgic, stiff knee gait pattern   6/21/24, regression as expected as patient is now 2 days s/p TKR  Current: 500' with SPC though mechanics vary depending on how stiff her knee is that day as she does continue to lack flexion and extension of her left knee   7/1/24     Patient will be able to safely negotiate a full flight of stairs with a step-through pattern while holding onto at least one handrail in order to return to normal with this functional activity and improve safety on stairs.  Pre-Op Eval: step-to pattern, descents more difficult than ascents  Post-Op Reassess: step-to pattern   6/21/24, regression as expected as patient is now 2 days s/p TKR     Patient will be able to perform at least 15 reps of sit <> stands with good form/control during 30\" STS test to demonstrate improved LE strength and stability, thus reducing the patients risk of falls.  Pre-Op Eval: 9 reps, without use of hands but decreased eccentric control during

## 2024-07-02 ENCOUNTER — HOSPITAL ENCOUNTER (OUTPATIENT)
Facility: HOSPITAL | Age: 59
Setting detail: RECURRING SERIES
Discharge: HOME | End: 2024-07-05
Payer: OTHER GOVERNMENT

## 2024-07-02 PROCEDURE — 97016 VASOPNEUMATIC DEVICE THERAPY: CPT

## 2024-07-02 PROCEDURE — 97535 SELF CARE MNGMENT TRAINING: CPT

## 2024-07-02 PROCEDURE — 97530 THERAPEUTIC ACTIVITIES: CPT

## 2024-07-02 PROCEDURE — 97110 THERAPEUTIC EXERCISES: CPT

## 2024-07-02 NOTE — PROGRESS NOTES
return to patient's PLOF.  Pre-Op Eval: 10/10 pain at worst, but an average daily pain of 3/10  Post-Op Reassess: 10/10 pain at worst since having surgery 2 days ago   6/21/24 7/2/24 PN: 8/10 pain at worst in the last week Progressing      Patient will demonstrate at least 0-120 degs of post-surgical knee AROM in order to return to prior functional activities and mobility.  Pre-Op Eval: Left Knee AROM: 0-130 degs  Post-Op Reassess: AROM: 16-75 degs, PROM: 14-81 degs   6/21/24, regression as expected as patient is now 2 days s/p TKR               7/2/24 PN: AROM: 7-105 deg Progressing     Patient will demonstrate 5/5 strength of bilateral LE's in order to be able to safely perform functional activities and demonstrate improved stability and strength.  Pre-Op Eval: Bilateral LE's grossly 5/5 bilaterally except bilateral hip extension with knee bent 3+/5  Post-Op Reassess: did not retest as patient is s/p TKR 2 days ago, but fair quad contraction noted though (+) extensor lag   6/21/24, regression as expected as patient is now 2 days s/p TKR               7/2/24 PN: Grossly 5/5 in bilateral LE's except Left Hip Ext: 4-/5 Left Knee: Ext: 4/5, Flex: 3+/5 Progressing     Patient will be able to safely ambulate community distances without AD and functional gait mechanics in order to improve overall mobility and return patient to his or her PLOF.  Pre-Op Eval: WNL community distances without AD  Post-Op Reassess: household distances with FWW with antalgic, stiff knee gait pattern   6/21/24, regression as expected as patient is now 2 days s/p TKR  7/2/24 PN:Pt demonstrates step through gait mechanics with SPC with occasional antalgic step with left LE, good heel strike and step length bilaterally Progressing     Patient will be able to safely negotiate a full flight of stairs with a step-through pattern while holding onto at least one handrail in order to return to normal with this functional activity and 
[facility]   7/17/2024 10:30 AM Galo, Juan David H, PTA [facility]   7/19/2024  9:10 AM Galo, Juan David H, PTA [facility]   7/22/2024 12:30 PM FiordalizaCarolyn park, PT [facility]   7/24/2024  9:50 AM Fiordaliza, Carolyn PINZON, PT [facility]   7/26/2024  9:10 AM Galo, Juan David H, PTA [facility]   7/29/2024  9:50 AM Carolyn Mancera, PT [facility]   7/31/2024 10:30 AM FiordalizaCarolyn park, PT [facility]   8/2/2024  9:50 AM Galo, Juan David H, PTA [facility]   8/5/2024 10:30 AM Galo, Juan David H, PTA [facility]   8/7/2024  9:50 AM Carolyn Mancera, PT [facility]   8/14/2024  9:50 AM Carolyn Mancera, PT [facility]   8/16/2024  3:10 PM Carolyn Mancera, PT [facility]   8/19/2024  5:10 PM Galo, Juan David H, PTA [facility]   8/21/2024  5:10 PM Galo, Juan David H, PTA [facility]   8/23/2024  8:30 AM Carolyn Mancera, PT [facility]   8/26/2024  5:10 PM Galo, Juan David H, PTA [facility]   8/28/2024  5:10 PM Galo, Juan David H, PTA [facility]   8/30/2024  3:10 PM Fiordaliza, Carolyn K, PT [facility]

## 2024-07-03 ENCOUNTER — HOSPITAL ENCOUNTER (OUTPATIENT)
Facility: HOSPITAL | Age: 59
Setting detail: RECURRING SERIES
Discharge: HOME | End: 2024-07-06
Payer: OTHER GOVERNMENT

## 2024-07-03 PROCEDURE — 97110 THERAPEUTIC EXERCISES: CPT

## 2024-07-03 PROCEDURE — 97016 VASOPNEUMATIC DEVICE THERAPY: CPT

## 2024-07-03 PROCEDURE — 97112 NEUROMUSCULAR REEDUCATION: CPT

## 2024-07-03 PROCEDURE — 97530 THERAPEUTIC ACTIVITIES: CPT

## 2024-07-03 NOTE — PROGRESS NOTES
H, PTA [facility]   8/30/2024  3:10 PM Carolyn Mancera, PT [facility]

## 2024-07-05 ENCOUNTER — APPOINTMENT (OUTPATIENT)
Facility: HOSPITAL | Age: 59
End: 2024-07-05
Payer: OTHER GOVERNMENT

## 2024-07-08 ENCOUNTER — HOSPITAL ENCOUNTER (OUTPATIENT)
Facility: HOSPITAL | Age: 59
Setting detail: RECURRING SERIES
Discharge: HOME | End: 2024-07-11
Payer: OTHER GOVERNMENT

## 2024-07-08 PROCEDURE — 97016 VASOPNEUMATIC DEVICE THERAPY: CPT

## 2024-07-08 PROCEDURE — 97112 NEUROMUSCULAR REEDUCATION: CPT

## 2024-07-08 PROCEDURE — 97530 THERAPEUTIC ACTIVITIES: CPT

## 2024-07-08 PROCEDURE — 97110 THERAPEUTIC EXERCISES: CPT

## 2024-07-08 NOTE — PROGRESS NOTES
PHYSICAL / OCCUPATIONAL THERAPY - DAILY TREATMENT NOTE     Patient Name: Paige Barahona    Date: 2024    : 1965  Insurance: Payor:  EAST / Plan: [employer] EAST PRIME / Product Type: *No Product type* /      Patient  verified Yes     Visit #   Current / Total 11 35   Time   In / Out 9:50 11:18   Pain   In / Out 0 3   Subjective Functional Status/Changes: Patient states she's not having any pain currently, but states she traveled to [place] over the weekend \"and the heat caused my knee to swell more, so it's making my knee more stiff.\"   Changes to:  Allergies, Med Hx, Sx Hx?   no       TREATMENT AREA =  Pain in left knee [M25.562]    OBJECTIVE    Modalities Rationale:     decrease edema, decrease inflammation, and decrease pain to improve patient's ability to progress to PLOF and address remaining functional goals.     min [] Estim Unattended, type/location:                                      []  w/ice    []  w/heat    min [] Estim Attended, type/location:                                     []  w/US     []  w/ice    []  w/heat    []  TENS insruct      min []  Mechanical Traction: type/lbs                   []  pro   []  sup   []  int   []  cont    []  before manual    []  after manual    min []  Ultrasound, settings/location:      min []  Iontophoresis w/ dexamethasone, location:                                               []  take home patch       []  in clinic        min  unbilled []  Ice     []  Heat    location/position:     min []  Paraffin,  details:    10 min [x]  Vasopneumatic Device, press/temp: Moderate/34 degs    min []  Whirlpool / Fluido:    If using vaso (only need to measure limb vaso being performed on)      pre-treatment girth : 43.5      post-treatment girth : 44      measured at (landmark location) :  mid-patella    min []  Other:    Skin assessment post-treatment (if applicable):    []  intact    []  redness- no adverse reaction                 []redness - adverse reaction:

## 2024-07-10 ENCOUNTER — HOSPITAL ENCOUNTER (OUTPATIENT)
Facility: HOSPITAL | Age: 59
Setting detail: RECURRING SERIES
Discharge: HOME | End: 2024-07-13
Payer: OTHER GOVERNMENT

## 2024-07-10 PROCEDURE — 97110 THERAPEUTIC EXERCISES: CPT

## 2024-07-10 PROCEDURE — 97112 NEUROMUSCULAR REEDUCATION: CPT

## 2024-07-10 PROCEDURE — 97016 VASOPNEUMATIC DEVICE THERAPY: CPT

## 2024-07-10 PROCEDURE — 97535 SELF CARE MNGMENT TRAINING: CPT

## 2024-07-10 PROCEDURE — 97530 THERAPEUTIC ACTIVITIES: CPT

## 2024-07-10 NOTE — PROGRESS NOTES
[facility]   7/29/2024  9:50 AM Carolyn Mancera, PT [facility]   7/31/2024 10:30 AM Carolyn Mancera, PT [facility]   8/2/2024  9:50 AM Galo, Juan David H, PTA [facility]   8/5/2024 10:30 AM Galo, Juan David H, PTA [facility]   8/7/2024  9:50 AM Carolyn Mancera, PT [facility]   8/14/2024  9:50 AM Carolyn Mancera, PT [facility]   8/16/2024  3:10 PM Carolyn Mancera, PT [facility]   8/19/2024  5:10 PM Galo, Juan David H, PTA [facility]   8/21/2024  5:10 PM Galo, Juan David H, PTA [facility]   8/23/2024  8:30 AM Carolyn Mancera, PT [facility]   8/26/2024  5:10 PM Galo, Juan David H, PTA [facility]   8/28/2024  5:10 PM Galo, Juan David H, PTA [facility]   8/30/2024  3:10 PM Carolyn Mancera, PT [facility]

## 2024-07-11 ENCOUNTER — TELEPHONE (OUTPATIENT)
Facility: HOSPITAL | Age: 59
End: 2024-07-11

## 2024-07-12 ENCOUNTER — HOSPITAL ENCOUNTER (OUTPATIENT)
Facility: HOSPITAL | Age: 59
Setting detail: RECURRING SERIES
Discharge: HOME | End: 2024-07-15
Payer: OTHER GOVERNMENT

## 2024-07-12 PROCEDURE — 97110 THERAPEUTIC EXERCISES: CPT

## 2024-07-12 PROCEDURE — 97112 NEUROMUSCULAR REEDUCATION: CPT

## 2024-07-12 PROCEDURE — 97016 VASOPNEUMATIC DEVICE THERAPY: CPT

## 2024-07-12 PROCEDURE — 97535 SELF CARE MNGMENT TRAINING: CPT

## 2024-07-12 PROCEDURE — 97530 THERAPEUTIC ACTIVITIES: CPT

## 2024-07-12 NOTE — PROGRESS NOTES
PHYSICAL / OCCUPATIONAL THERAPY - DAILY TREATMENT NOTE     Patient Name: Paige Barahona    Date: 2024    : 1965  Insurance: Payor:  EAST / Plan:  EAST PRIME / Product Type: *No Product type* /      Patient  verified Yes     Visit #   Current / Total 13 35   Time   In / Out 10:30 11:35   Pain   In / Out 0/10 0/10   Subjective Functional Status/Changes: \"I don't have any pain.\"   Changes to:  Allergies, Med Hx, Sx Hx?   no       TREATMENT AREA =  Pain in left knee [M25.562]    OBJECTIVE    Modalities Rationale:     decrease edema, decrease inflammation, and decrease pain to improve patient's ability to progress to PLOF and address remaining functional goals.     min [] Estim Unattended, type/location:                                      []  w/ice    []  w/heat    min [] Estim Attended, type/location:                                     []  w/US     []  w/ice    []  w/heat    []  TENS insruct      min []  Mechanical Traction: type/lbs                   []  pro   []  sup   []  int   []  cont    []  before manual    []  after manual    min []  Ultrasound, settings/location:      min []  Iontophoresis w/ dexamethasone, location:                                               []  take home patch       []  in clinic        min  unbilled []  Ice     []  Heat    location/position:     min []  Paraffin,  details:    10 min [x]  Vasopneumatic Device, press/temp: Med/Low    min []  Whirlpool / Fluido:    If using vaso (only need to measure limb vaso being performed on)      pre-treatment girth : 41.5 cm      post-treatment girth : 40.8 cm      measured at (landmark location) :  Mid-Patella    min []  Other:    Skin assessment post-treatment (if applicable):    [x]  intact    []  redness- no adverse reaction                 []redness - adverse reaction:         Therapeutic Procedures:  Tx Min Billable or 1:1 Min (if diff from Tx Min) Procedure, Rationale, Specifics   21  [id number] Therapeutic Exercise

## 2024-07-15 ENCOUNTER — HOSPITAL ENCOUNTER (OUTPATIENT)
Facility: HOSPITAL | Age: 59
Setting detail: RECURRING SERIES
Discharge: HOME | End: 2024-07-18
Payer: OTHER GOVERNMENT

## 2024-07-15 PROCEDURE — 97016 VASOPNEUMATIC DEVICE THERAPY: CPT

## 2024-07-15 PROCEDURE — 97535 SELF CARE MNGMENT TRAINING: CPT

## 2024-07-15 PROCEDURE — 97110 THERAPEUTIC EXERCISES: CPT

## 2024-07-15 PROCEDURE — 97112 NEUROMUSCULAR REEDUCATION: CPT

## 2024-07-15 PROCEDURE — 97530 THERAPEUTIC ACTIVITIES: CPT

## 2024-07-15 NOTE — PROGRESS NOTES
length bilaterally Progressing  Current: Pt able to amb without SPC but, demonstrates decreased TKE with heel strike but, able to maintain step through gait mechanics 7/10/24     Patient will be able to safely negotiate a full flight of stairs with a step-through pattern while holding onto at least one handrail in order to return to normal with this functional activity and improve safety on stairs.  Pre-Op Eval: step-to pattern, descents more difficult than ascents  Post-Op Reassess: step-to pattern   6/21/24, regression as expected as patient is now 2 days s/p TKR              7/2/24 PN: Pt able to perform step through pattern with Ander UE support with mild antalgic eccentric lowering with Left LE Progressing  Current: added step ups onto 6\" platform, verbal cueing needed to avoid left hip hike during ascents   7/8/2024, in progress      Patient will be able to perform at least 15 reps of sit <> stands with good form/control during 30\" STS test to demonstrate improved LE strength and stability, thus reducing the patients risk of falls.  Pre-Op Eval: 9 reps, without use of hands but decreased eccentric control during stand to sits   Post-Op Reassess: did not retest secondary to significant pain s/p TKR 2 days ago, will test at later date   6/21/24, regression as expected as patient is now 2 days s/p TKR  Current: 15 STS repetitions MET 6/27/24    PLAN  Yes  Continue plan of care  []  Upgrade activities as tolerated  []  Discharge due to :  []  Other:    Juan David Rosenthal PTA    7/15/2024    10:16 AM    Future Appointments   Date Time Provider Department Center   7/15/2024 11:50 AM Juan David Rosenthal PTA [facility]   7/17/2024 10:30 AM Juan David Rosenthal PTA [facility]   7/19/2024  9:10 AM Juan David Rosenthal PTA [facility]   7/22/2024 12:30 PM Carolyn Mancera PT [facility]   7/24/2024  9:50 AM Carolyn Mancera PT [facility]   7/26/2024  9:10 AM Juan David Rosenthal PTA [facility]   7/29/2024  9:50 AM Fiordaliza

## 2024-07-17 ENCOUNTER — HOSPITAL ENCOUNTER (OUTPATIENT)
Facility: HOSPITAL | Age: 59
Setting detail: RECURRING SERIES
Discharge: HOME | End: 2024-07-20
Payer: OTHER GOVERNMENT

## 2024-07-17 PROCEDURE — 97112 NEUROMUSCULAR REEDUCATION: CPT

## 2024-07-17 PROCEDURE — 97016 VASOPNEUMATIC DEVICE THERAPY: CPT

## 2024-07-17 PROCEDURE — 97530 THERAPEUTIC ACTIVITIES: CPT

## 2024-07-17 PROCEDURE — 97110 THERAPEUTIC EXERCISES: CPT

## 2024-07-17 PROCEDURE — 97535 SELF CARE MNGMENT TRAINING: CPT

## 2024-07-17 NOTE — PROGRESS NOTES
[facility]   8/2/2024  9:50 AM GaloJuan David rodriguez H, PTA [facility]   8/5/2024 10:30 AM GaloJuan David rodriguez H, PTA [facility]   8/7/2024  9:50 AM Carolyn Mancera, PT [facility]   8/14/2024  9:50 AM Carolyn Mancera, PT [facility]   8/16/2024  3:10 PM Carolyn Mancera, PT [facility]   8/19/2024  5:10 PM GaloJuan David rodriguez, PTA [facility]   8/21/2024  5:10 PM GaloJuan David rodriguez H, PTA [facility]   8/23/2024  8:30 AM Carolyn Mancera, PT [facility]   8/26/2024  5:10 PM GaloJuan David rodriguez H, PTA [facility]   8/28/2024  5:10 PM GaloJuan David rodriguez H, PTA [facility]   8/30/2024  3:10 PM Carolyn Mancera, PT [facility]

## 2024-07-19 ENCOUNTER — HOSPITAL ENCOUNTER (OUTPATIENT)
Facility: HOSPITAL | Age: 59
Setting detail: RECURRING SERIES
Discharge: HOME | End: 2024-07-22
Payer: OTHER GOVERNMENT

## 2024-07-19 PROCEDURE — 97016 VASOPNEUMATIC DEVICE THERAPY: CPT

## 2024-07-19 PROCEDURE — 97112 NEUROMUSCULAR REEDUCATION: CPT

## 2024-07-19 PROCEDURE — 97535 SELF CARE MNGMENT TRAINING: CPT

## 2024-07-19 PROCEDURE — 97530 THERAPEUTIC ACTIVITIES: CPT

## 2024-07-19 PROCEDURE — 97110 THERAPEUTIC EXERCISES: CPT

## 2024-07-19 NOTE — PROGRESS NOTES
PHYSICAL / OCCUPATIONAL THERAPY - DAILY TREATMENT NOTE     Patient Name: Paige Barahona    Date: 2024    : 1965  Insurance: Payor:  EAST / Plan: [employer] EAST PRIME / Product Type: *No Product type* /      Patient  verified Yes     Visit #   Current / Total 16 35   Time   In / Out 9:10 10:45   Pain   In / Out 3/10 2/10   Subjective Functional Status/Changes: \"I have some pain but, not above tolerance.    Changes to:  Allergies, Med Hx, Sx Hx?   no       TREATMENT AREA =  Pain in left knee [M25.562]    If an interpreting service is utilized for treatment of this patient, the contents of this document represent the material reviewed with the patient via the .     OBJECTIVE    Modalities Rationale:     decrease edema, decrease inflammation, and decrease pain to improve patient's ability to progress to PLOF and address remaining functional goals.     min [] Estim Unattended, type/location:                                      []  w/ice    []  w/heat    min [] Estim Attended, type/location:                                     []  w/US     []  w/ice    []  w/heat    []  TENS insruct      min []  Mechanical Traction: type/lbs                   []  pro   []  sup   []  int   []  cont    []  before manual    []  after manual    min []  Ultrasound, settings/location:      min []  Iontophoresis w/ dexamethasone, location:                                               []  take home patch       []  in clinic        min  unbilled []  Ice     []  Heat    location/position:     min []  Paraffin,  details:    10 min [x]  Vasopneumatic Device, press/temp: Med/Low    min []  Whirlpool / Fluido:    If using vaso (only need to measure limb vaso being performed on)      pre-treatment girth : 39.5 cm      post-treatment girth : 38.5 cm      measured at (landmark location) :  Mid-patella    min []  Other:    Skin assessment post-treatment (if applicable):    [x]  intact    []  redness- no adverse reaction

## 2024-07-22 ENCOUNTER — TELEPHONE (OUTPATIENT)
Facility: HOSPITAL | Age: 59
End: 2024-07-22

## 2024-07-22 ENCOUNTER — HOSPITAL ENCOUNTER (OUTPATIENT)
Facility: HOSPITAL | Age: 59
Setting detail: RECURRING SERIES
End: 2024-07-22
Payer: OTHER GOVERNMENT

## 2024-07-24 ENCOUNTER — HOSPITAL ENCOUNTER (OUTPATIENT)
Facility: HOSPITAL | Age: 59
Setting detail: RECURRING SERIES
Discharge: HOME | End: 2024-07-27
Payer: OTHER GOVERNMENT

## 2024-07-24 PROCEDURE — 97016 VASOPNEUMATIC DEVICE THERAPY: CPT

## 2024-07-24 PROCEDURE — 97110 THERAPEUTIC EXERCISES: CPT

## 2024-07-24 PROCEDURE — 97112 NEUROMUSCULAR REEDUCATION: CPT

## 2024-07-24 PROCEDURE — 97530 THERAPEUTIC ACTIVITIES: CPT

## 2024-07-24 NOTE — PROGRESS NOTES
Carolyn PINZON, PT [facility]   8/19/2024  5:10 PM Galo, Juan David H, PTA [facility]   8/21/2024  5:10 PM Juan David Rosenthal H, PTA [facility]   8/23/2024  8:30 AM Carolyn Mancera, PT [facility]   8/26/2024  5:10 PM Juan David Rosenthal NIDHI, PTA [facility]   8/28/2024  5:10 PM Juan David Rosenthal, PTA [facility]   8/30/2024  3:10 PM Carolyn Mancera, PT [facility]

## 2024-07-25 ENCOUNTER — HOSPITAL ENCOUNTER (OUTPATIENT)
Facility: HOSPITAL | Age: 59
Discharge: HOME | End: 2024-07-25
Attending: ORTHOPAEDIC SURGERY
Payer: OTHER GOVERNMENT

## 2024-07-25 DIAGNOSIS — M17.11 ARTHRITIS OF RIGHT KNEE: ICD-10-CM

## 2024-07-25 PROCEDURE — 73700 CT LOWER EXTREMITY W/O DYE: CPT

## 2024-07-26 ENCOUNTER — HOSPITAL ENCOUNTER (OUTPATIENT)
Facility: HOSPITAL | Age: 59
Setting detail: RECURRING SERIES
Discharge: HOME | End: 2024-07-29
Payer: OTHER GOVERNMENT

## 2024-07-26 PROCEDURE — 97016 VASOPNEUMATIC DEVICE THERAPY: CPT

## 2024-07-26 PROCEDURE — 97112 NEUROMUSCULAR REEDUCATION: CPT

## 2024-07-26 PROCEDURE — 97530 THERAPEUTIC ACTIVITIES: CPT

## 2024-07-26 PROCEDURE — 97535 SELF CARE MNGMENT TRAINING: CPT

## 2024-07-26 PROCEDURE — 97110 THERAPEUTIC EXERCISES: CPT

## 2024-07-26 NOTE — PROGRESS NOTES
[facility]   8/7/2024  9:50 AM Carolyn Mancera, PT [facility]   8/14/2024  9:50 AM Carolyn Mancera, PT [facility]   8/16/2024  3:10 PM Carolyn Mancera, PT [facility]   8/19/2024  5:10 PM Galo Juan David H, PTA [facility]   8/21/2024  5:10 PM Juan David Rosenthal H, PTA [facility]   8/23/2024  8:30 AM Carolyn Mancera, PT [facility]   8/26/2024  5:10 PM Juan David Rosenthal H, PTA [facility]   8/28/2024  5:10 PM Galo Juan David H, PTA [facility]   8/30/2024  3:10 PM Carolyn Mancera, PT [facility]

## 2024-07-29 ENCOUNTER — HOSPITAL ENCOUNTER (OUTPATIENT)
Facility: HOSPITAL | Age: 59
Setting detail: RECURRING SERIES
Discharge: HOME | End: 2024-08-01
Payer: OTHER GOVERNMENT

## 2024-07-29 ENCOUNTER — TELEPHONE (OUTPATIENT)
Facility: HOSPITAL | Age: 59
End: 2024-07-29

## 2024-07-29 PROCEDURE — 97016 VASOPNEUMATIC DEVICE THERAPY: CPT

## 2024-07-29 PROCEDURE — 97530 THERAPEUTIC ACTIVITIES: CPT

## 2024-07-29 PROCEDURE — 97535 SELF CARE MNGMENT TRAINING: CPT

## 2024-07-29 PROCEDURE — 97110 THERAPEUTIC EXERCISES: CPT

## 2024-07-29 NOTE — TELEPHONE ENCOUNTER
Spoke with Dr. Banuelos regarding patients continued limitations with flexion/extension ROM as she is now 6 weeks s/p TKR. He said to keep working on ROM to see what she can get as she likely has scar tissue built up in the knee. called to inform pt too

## 2024-07-29 NOTE — PROGRESS NOTES
[facility]  [address] [place] [address]  Ph [phone]  Fx [phone]    Physical Therapy Progress Note  Patient name: Paige Barahona Start of Care: 2024   Referral source: Chau Banuelos MD : 1965   Medical/Treatment Diagnosis: Pain in left knee [M25.562] Onset Date:2020   Prior Hospitalization: see medical history Provider#: [id number]   Medications: Verified on Patient Summary List     Comorbidities: OA, chronic bilateral knee pain, s/p bilateral knee scopes ( and ), hiatal hernia repair, tummy tuck, breast augmentation   Prior Level of Function: active with working out 4x/week, gradually decreasing difficulty with running, squatting, and jumping over the last 3 years  [] Unrestricted with functional activities and ADL's  [x] No assistive device  [x] active lifestyle, [] moderately active lifestyle, [] sedentary lifestyle    Visits from Start of Care: 19    Missed Visits: 1    Summary of Care/ Key Functional Changes: Patient has participated in 19 visits of skilled PT thus far for treatment s/p left TKR on 24.  Patient continues to make slow, but steady progress towards goals, including now having met 4/5 STG's and 4/7 LTG's.  Patient is now able to ambulate community distances without AD, though she does require verbal cueing to ambulate with normal gait mechanics as she otherwise has a tendency to limp and avoid appropriate flexion/extension of her left knee during the phases of gait.  When cued and patient is concentrating on mechanics, she is able to ambulate with normal gait mechanics.  Patient is now also able to negotiate a full staircase with normal step-through pattern and with steadiness, albeit on occasion she does still circumduct her left LE during ascents unless verbally cued not to.  Patient has regained excellent strength of bilateral LE's, but she appears to have plateau'd with ROM over the last month.  Patient has made no 
with Left LE Progressing  PN 7/29/2024: normal step-through pattern for full flight of stairs with 0 and 1 handrail without difficulty and normal steadiness   MET      Patient will be able to perform at least 15 reps of sit <> stands with good form/control during 30\" STS test to demonstrate improved LE strength and stability, thus reducing the patients risk of falls.  Pre-Op Eval: 9 reps, without use of hands but decreased eccentric control during stand to sits   Post-Op Reassess: did not retest secondary to significant pain s/p TKR 2 days ago, will test at later date   6/21/24, regression as expected as patient is now 2 days s/p TKR  Current: 15 STS repetitions MET 6/27/24    PLAN  Yes  Continue plan of care  []  Upgrade activities as tolerated  []  Discharge due to :  []  Other:    Carolyn Mancera, [facility]    7/29/2024    8:04 AM    Future Appointments   Date Time Provider Department Center   7/29/2024  9:50 AM Carolyn Mancera, PT [facility]   7/31/2024 10:30 AM Carolyn Mancera, PT [facility]   8/2/2024  9:50 AM Galo Juan David H, PTA [facility]   8/5/2024 10:30 AM Galo, Juan David H, PTA [facility]   8/7/2024  9:50 AM Carolyn Mancera, PT [facility]   8/14/2024  9:50 AM Carolyn Mancera, PT [facility]   8/16/2024  3:10 PM Carolyn Mancera, PT [facility]   8/19/2024  5:10 PM Galo, Juan David H, PTA [facility]   8/21/2024  5:10 PM Galo, Juan David H, PTA [facility]   8/23/2024  8:30 AM Carolyn Mancera, PT [facility]   8/26/2024  5:10 PM Galo, Juan David H, PTA [facility]   8/28/2024  5:10 PM Galo, Juan David H, PTA [facility]   8/30/2024  3:10 PM Carolyn Mancera, PT [facility]

## 2024-07-31 ENCOUNTER — HOSPITAL ENCOUNTER (OUTPATIENT)
Facility: HOSPITAL | Age: 59
Setting detail: RECURRING SERIES
Discharge: HOME | End: 2024-08-03
Payer: OTHER GOVERNMENT

## 2024-07-31 PROCEDURE — 97016 VASOPNEUMATIC DEVICE THERAPY: CPT

## 2024-07-31 PROCEDURE — 97112 NEUROMUSCULAR REEDUCATION: CPT

## 2024-07-31 PROCEDURE — 97110 THERAPEUTIC EXERCISES: CPT

## 2024-07-31 PROCEDURE — 97530 THERAPEUTIC ACTIVITIES: CPT

## 2024-08-02 ENCOUNTER — HOSPITAL ENCOUNTER (OUTPATIENT)
Facility: HOSPITAL | Age: 59
Setting detail: RECURRING SERIES
Discharge: HOME | End: 2024-08-05
Payer: OTHER GOVERNMENT

## 2024-08-02 PROCEDURE — 97530 THERAPEUTIC ACTIVITIES: CPT

## 2024-08-02 PROCEDURE — 97535 SELF CARE MNGMENT TRAINING: CPT

## 2024-08-02 PROCEDURE — 97110 THERAPEUTIC EXERCISES: CPT

## 2024-08-02 PROCEDURE — 97112 NEUROMUSCULAR REEDUCATION: CPT

## 2024-08-02 PROCEDURE — 97016 VASOPNEUMATIC DEVICE THERAPY: CPT

## 2024-08-02 NOTE — PROGRESS NOTES
Progressed/Changed HEP, detail:    [] Other detail:       Objective Information/Functional Measures/Assessment    Pt arrived in clinic reporting minor pain in bilateral knees. Pt continues to tolerate general dynamic balance therex with no increased pain. Pt demonstrated moderate difficulty with eccentric step downs, requiring UE assistance and reporting minor pain in Right Knee. Pt continues to perform prone knee hang ex in order to achieve TKE for improved general gait mechanics. Pt received VASO post tx for reduction of pain and swelling. Pt will benefit from continued therapy to address unmet goals and to return to prior level of activity.       Patient will continue to benefit from skilled PT / OT services to modify and progress therapeutic interventions, analyze and address functional mobility deficits, analyze and address ROM deficits, analyze and address strength deficits, analyze and address soft tissue restrictions, analyze and cue for proper movement patterns, and analyze and modify for postural abnormalities to address functional deficits and attain remaining goals.    Progress toward goals / Updated goals:  []  See Progress Note/Recertification    Short Term Goals:    to be accomplished within 18 treatments:     Patient will be compliant and independent with prescribed HEP in order to assist in maximizing therapeutic gains and improving overall function.  Pre-Op Eval: HEP issued and reviewed with patient today, along with edema reduction education  Post-Op Reassess: HEP issued and reviewed, asked to please perform 2-3x/day   6/21/24  Current: Patient reports compliance with her HEP throughout her day.   6/25/24, MET     Patient will report at least a 25% reduction in pain/symptoms when performing ADLs/functional activities in order to improve overall tolerance to functional movements and progress towards PLOF.  Pre-Op Eval: 10/10 pain at worst, but an average daily pain of 3/10  Post-Op Reassess: 10/10

## 2024-08-05 ENCOUNTER — HOSPITAL ENCOUNTER (OUTPATIENT)
Facility: HOSPITAL | Age: 59
Setting detail: RECURRING SERIES
Discharge: HOME | End: 2024-08-08
Payer: OTHER GOVERNMENT

## 2024-08-05 PROCEDURE — 97112 NEUROMUSCULAR REEDUCATION: CPT

## 2024-08-05 PROCEDURE — 97110 THERAPEUTIC EXERCISES: CPT

## 2024-08-05 NOTE — PROGRESS NOTES
PHYSICAL / OCCUPATIONAL THERAPY - DAILY TREATMENT NOTE     Patient Name: Paige Barahona    Date: 2024    : 1965  Insurance: Payor: [employer] EAST / Plan:  EAST PRIME / Product Type: *No Product type* /      Patient  verified Yes     Visit #   Current / Total 22 35   Time   In / Out 10:34 11:45   Pain   In / Out 0 1   Subjective Functional Status/Changes: Patient has no new complaints.   Changes to:  Allergies, Med Hx, Sx Hx?   no       TREATMENT AREA =  Pain in left knee [M25.562]    If an interpreting service is utilized for treatment of this patient, the contents of this document represent the material reviewed with the patient via the .     OBJECTIVE    Modalities Rationale:     patient declined    Therapeutic Procedures:  Tx Min Billable or 1:1 Min (if diff from Tx Min) Procedure, Rationale, Specifics   49  93893 Therapeutic Exercise (timed):  increase ROM, strength, coordination, balance, and proprioception to improve patient's ability to progress to PLOF and address remaining functional goals. (see flow sheet as applicable)    Details if applicable:       12  14795 Therapeutic Activity (timed):  use of dynamic activities replicating functional movements to increase ROM, strength, coordination, balance, and proprioception in order to improve patient's ability to progress to PLOF and address remaining functional goals.  (see flow sheet as applicable)    Details if applicable:     10  07261 Neuromuscular Re-Education (timed):  improve balance, coordination, kinesthetic sense, posture, core stability and proprioception to improve patient's ability to develop conscious control of individual muscles and awareness of position of extremities in order to progress to PLOF and address remaining functional goals. (see flow sheet as applicable)     Details if applicable:     71  [facility] Totals Reminder: bill using total billable min of TIMED therapeutic procedures (example: do not include dry

## 2024-08-06 ENCOUNTER — TELEPHONE (OUTPATIENT)
Facility: HOSPITAL | Age: 59
End: 2024-08-06

## 2024-08-07 ENCOUNTER — APPOINTMENT (OUTPATIENT)
Facility: HOSPITAL | Age: 59
End: 2024-08-07
Payer: OTHER GOVERNMENT

## 2024-08-09 ENCOUNTER — APPOINTMENT (OUTPATIENT)
Facility: HOSPITAL | Age: 59
End: 2024-08-09
Payer: OTHER GOVERNMENT

## 2024-08-12 ENCOUNTER — APPOINTMENT (OUTPATIENT)
Facility: HOSPITAL | Age: 59
End: 2024-08-12
Payer: OTHER GOVERNMENT

## 2024-08-14 ENCOUNTER — HOSPITAL ENCOUNTER (OUTPATIENT)
Facility: HOSPITAL | Age: 59
Setting detail: RECURRING SERIES
Discharge: HOME | End: 2024-08-17
Payer: OTHER GOVERNMENT

## 2024-08-14 PROCEDURE — 97016 VASOPNEUMATIC DEVICE THERAPY: CPT

## 2024-08-14 PROCEDURE — 97110 THERAPEUTIC EXERCISES: CPT

## 2024-08-14 PROCEDURE — 97535 SELF CARE MNGMENT TRAINING: CPT

## 2024-08-14 PROCEDURE — 97530 THERAPEUTIC ACTIVITIES: CPT

## 2024-08-14 NOTE — PROGRESS NOTES
demonstrate improved LE strength and stability, thus reducing the patients risk of falls.  Pre-Op Eval: 9 reps, without use of hands but decreased eccentric control during stand to sits   Post-Op Reassess: did not retest secondary to significant pain s/p TKR 2 days ago, will test at later date   6/21/24, regression as expected as patient is now 2 days s/p TKR  Current: 15 STS repetitions  MET 6/27/24    PLAN  Yes  Continue plan of care  []  Upgrade activities as tolerated  []  Discharge due to :  []  Other:    Carolyn Mancera, PT    8/14/2024    9:22 AM    Future Appointments   Date Time Provider Department Center   8/16/2024  3:10 PM Carolyn Mancera, PT [facility]   8/19/2024  5:10 PM Juan David Rosenthal, PTA [facility]   8/21/2024  5:10 PM Juan David Rosenthal, PTA [facility]   8/23/2024  8:30 AM Carolyn Mancera, PT [facility]   8/26/2024  5:10 PM Juan David Rosenthal, PTA [facility]   8/28/2024  5:10 PM Juan David Rosenthal H, PTA [facility]   8/30/2024  3:10 PM Carolyn Mancera, PT [facility]

## 2024-08-15 ENCOUNTER — HOSPITAL ENCOUNTER (OUTPATIENT)
Facility: HOSPITAL | Age: 59
Setting detail: RECURRING SERIES
Discharge: HOME | End: 2024-08-18
Payer: OTHER GOVERNMENT

## 2024-08-15 PROCEDURE — 97112 NEUROMUSCULAR REEDUCATION: CPT

## 2024-08-15 PROCEDURE — 97530 THERAPEUTIC ACTIVITIES: CPT

## 2024-08-15 PROCEDURE — 97110 THERAPEUTIC EXERCISES: CPT

## 2024-08-15 PROCEDURE — 97140 MANUAL THERAPY 1/> REGIONS: CPT

## 2024-08-15 NOTE — PROGRESS NOTES
PHYSICAL / OCCUPATIONAL THERAPY - DAILY TREATMENT NOTE     Patient Name: Paige Barahona    Date: 8/15/2024    : 1965  Insurance: Payor:  EAST / Plan:  EAST PRIME / Product Type: *No Product type* /      Patient  verified Yes     Visit #   Current / Total 24 35   Time   In / Out 4:32 5:12   Pain   In / Out 2 2   Subjective Functional Status/Changes: Patient reports soreness from moving desks and classroom around to get ready for school.    Changes to:  Allergies, Med Hx, Sx Hx?   yes       TREATMENT AREA =  Pain in left knee [M25.562]    If an interpreting service is utilized for treatment of this patient, the contents of this document represent the material reviewed with the patient via the .     OBJECTIVE      Therapeutic Procedures:  Tx Min Billable or 1:1 Min (if diff from Tx Min) Procedure, Rationale, Specifics   12  81268 Therapeutic Exercise (timed):  increase ROM, strength, coordination, balance, and proprioception to improve patient's ability to progress to PLOF and address remaining functional goals. (see flow sheet as applicable)    Details if applicable:       12  24246 Neuromuscular Re-Education (timed):  improve balance, coordination, kinesthetic sense, posture, core stability and proprioception to improve patient's ability to develop conscious control of individual muscles and awareness of position of extremities in order to progress to PLOF and address remaining functional goals. (see flow sheet as applicable)    Details if applicable:     8  48746 Therapeutic Activity (timed):  use of dynamic activities replicating functional movements to increase ROM, strength, coordination, balance, and proprioception in order to improve patient's ability to progress to PLOF and address remaining functional goals.  (see flow sheet as applicable)     Details if applicable:     8  45397 Manual Therapy (timed):  decrease pain, increase ROM, and increase tissue extensibility to

## 2024-08-16 ENCOUNTER — APPOINTMENT (OUTPATIENT)
Facility: HOSPITAL | Age: 59
End: 2024-08-16
Payer: OTHER GOVERNMENT

## 2024-08-19 ENCOUNTER — HOSPITAL ENCOUNTER (OUTPATIENT)
Facility: HOSPITAL | Age: 59
Setting detail: RECURRING SERIES
Discharge: HOME | End: 2024-08-22
Payer: OTHER GOVERNMENT

## 2024-08-19 PROCEDURE — 97535 SELF CARE MNGMENT TRAINING: CPT

## 2024-08-19 PROCEDURE — 97530 THERAPEUTIC ACTIVITIES: CPT

## 2024-08-19 PROCEDURE — 97110 THERAPEUTIC EXERCISES: CPT

## 2024-08-19 NOTE — PROGRESS NOTES
PHYSICAL / OCCUPATIONAL THERAPY - DAILY TREATMENT NOTE     Patient Name: Paige Barahona    Date: 2024    : 1965  Insurance: Payor:  EAST / Plan:  EAST PRIME / Product Type: *No Product type* /      Patient  verified Yes     Visit #   Current / Total 25 35   Time   In / Out 5:10 6:06   Pain   In / Out 2/10 2/10   Subjective Functional Status/Changes: \"I have some pain today.\"   Changes to:  Allergies, Med Hx, Sx Hx?   no       TREATMENT AREA =  Pain in left knee [M25.562]    If an interpreting service is utilized for treatment of this patient, the contents of this document represent the material reviewed with the patient via the .     OBJECTIVE    Therapeutic Procedures:  Tx Min Billable or 1:1 Min (if diff from Tx Min) Procedure, Rationale, Specifics   34  47176 Therapeutic Exercise (timed):  increase ROM, strength, coordination, balance, and proprioception to improve patient's ability to progress to PLOF and address remaining functional goals. (see flow sheet as applicable)    Details if applicable:         16094 Neuromuscular Re-Education (timed):  improve balance, coordination, kinesthetic sense, posture, core stability and proprioception to improve patient's ability to develop conscious control of individual muscles and awareness of position of extremities in order to progress to PLOF and address remaining functional goals. (see flow sheet as applicable)    Details if applicable:     12  54343 Therapeutic Activity (timed):  use of dynamic activities replicating functional movements to increase ROM, strength, coordination, balance, and proprioception in order to improve patient's ability to progress to PLOF and address remaining functional goals.  (see flow sheet as applicable)     Details if applicable:     10  05406 Self Care/Home Management (timed):  improve patient knowledge and understanding of pain reducing techniques, positioning, and posture/ergonomics  to improve

## 2024-08-20 ENCOUNTER — HOSPITAL ENCOUNTER (OUTPATIENT)
Facility: HOSPITAL | Age: 59
Setting detail: RECURRING SERIES
Discharge: HOME | End: 2024-08-23
Payer: OTHER GOVERNMENT

## 2024-08-20 PROCEDURE — 97530 THERAPEUTIC ACTIVITIES: CPT

## 2024-08-20 PROCEDURE — 97110 THERAPEUTIC EXERCISES: CPT

## 2024-08-20 PROCEDURE — 97535 SELF CARE MNGMENT TRAINING: CPT

## 2024-08-20 NOTE — PROGRESS NOTES
PHYSICAL / OCCUPATIONAL THERAPY - DAILY TREATMENT NOTE     Patient Name: Paige Barahona    Date: 2024    : 1965  Insurance: Payor: [employer] EAST / Plan: [employer] PRIME / Product Type: *No Product type* /      Patient  verified Yes   Visit #   Current / Total 26 35   Time   In / Out 4:22 5:08   Pain   In / Out 0 0   Subjective Functional Status/Changes: Patient has no new complaints.   Changes to:  Allergies, Med Hx, Sx Hx?   no       TREATMENT AREA =  Pain in left knee [M25.562]    If an interpreting service is utilized for treatment of this patient, the contents of this document represent the material reviewed with the patient via the .     OBJECTIVE    Therapeutic Procedures:  Tx Min Billable or 1:1 Min (if diff from Tx Min) Procedure, Rationale, Specifics   25  37688 Therapeutic Exercise (timed):  increase ROM, strength, coordination, balance, and proprioception to improve patient's ability to progress to PLOF and address remaining functional goals. (see flow sheet as applicable)    Details if applicable:       13  46639 Therapeutic Activity (timed):  use of dynamic activities replicating functional movements to increase ROM, strength, coordination, balance, and proprioception in order to improve patient's ability to progress to PLOF and address remaining functional goals.  (see flow sheet as applicable)    Details if applicable:     8  87004 Self Care/Home Management (timed):  improve patient knowledge and understanding of diagnosis/prognosis, physical therapy expectations, procedures and progression, and discharge instructions  to improve patient's ability to progress to PLOF and address remaining functional goals.  (see flow sheet as applicable)    Details if applicable:     46  [facility] Totals Reminder: bill using total billable min of TIMED therapeutic procedures (example: do not include dry needle or estim unattended, both untimed codes, in totals to left)  8-22 min = 1 unit; 
Physical Therapy Discharge Instructions    [facility] at [facility]  [address] [place] [address]  Ph [phone]  Fx [phone]      Patient: Paige Barahona  : 1965      Continue Home Exercise Program 1 times per day for 4-6 weeks, then decrease to 3 times per week      Continue with    [x] Ice  as needed     [x] Heat           Follow up with MD:     [] Upon completion of therapy     [x] As needed      Recommendations:     [x]   Return to activity with home program    []   Return to activity with the following modifications:       []Post Rehab Program    []Join Independent aquatic program     []Return to/join local gym      Additional Comments: Thank you for choosing [facility]!    Carolyn Mancera, PT 2024 12:32 PM   
towards remaining goals, so she is discharged at this time.  Patient has advised to continue with ROM exercises throughout her day to see if further time with exercises on her own will help to loosen her knee up further.    Short Term Goals:    to be accomplished within 18 treatments:     Patient will be compliant and independent with prescribed HEP in order to assist in maximizing therapeutic gains and improving overall function.  Pre-Op Eval: HEP issued and reviewed with patient today, along with edema reduction education  Post-Op Reassess: HEP issued and reviewed, asked to please perform 2-3x/day   6/21/24  Current: Patient reports compliance with her HEP throughout her day.   6/25/24, MET     Patient will report at least a 25% reduction in pain/symptoms when performing ADLs/functional activities in order to improve overall tolerance to functional movements and progress towards PLOF.  Pre-Op Eval: 10/10 pain at worst, but an average daily pain of 3/10  Post-Op Reassess: 10/10 pain at worst since having surgery 2 days ago   6/21/24  Current: 7/10 pain at worst in the last week   7/8/2024, MET      Patient will demonstrate at least 0-90 degs of post-surgical knee AROM to reduce risk of contracture and improve gait mechanics.  Pre-Op Eval: Left Knee AROM: 0-130 degs  Post-Op Reassess: AROM: 16-75 degs, PROM: 14-81 degs   6/21/24, regression as expected as patient is now 2 days s/p TKR  7/2/24 PN: AROM: 7-105 deg Progressing              PN 7/29/2024: AROM: 7-106 degs   NO CHANGE, STG ROM goal discontinued as it was not met in 18 visits, see LTG below for ROM     Patient will be able to safely at least 500' with SPC and functional gait mechanics to improve patient's ambulation when attending doctor's appointments.  Pre-Op Eval: WNL community distances without AD  Post-Op Reassess: household distances with FWW with antalgic, stiff knee gait pattern   6/21/24, regression as expected as patient is now 2 days s/p TKR

## 2024-08-21 ENCOUNTER — APPOINTMENT (OUTPATIENT)
Facility: HOSPITAL | Age: 59
End: 2024-08-21
Payer: OTHER GOVERNMENT

## 2024-08-23 ENCOUNTER — APPOINTMENT (OUTPATIENT)
Facility: HOSPITAL | Age: 59
End: 2024-08-23
Payer: OTHER GOVERNMENT

## 2024-08-26 ENCOUNTER — APPOINTMENT (OUTPATIENT)
Facility: HOSPITAL | Age: 59
End: 2024-08-26
Payer: OTHER GOVERNMENT

## 2024-08-28 ENCOUNTER — APPOINTMENT (OUTPATIENT)
Facility: HOSPITAL | Age: 59
End: 2024-08-28
Payer: OTHER GOVERNMENT

## 2024-08-29 ENCOUNTER — APPOINTMENT (OUTPATIENT)
Facility: HOSPITAL | Age: 59
End: 2024-08-29
Payer: OTHER GOVERNMENT

## 2024-08-30 ENCOUNTER — APPOINTMENT (OUTPATIENT)
Facility: HOSPITAL | Age: 59
End: 2024-08-30
Payer: OTHER GOVERNMENT

## 2024-11-22 ENCOUNTER — TRANSCRIBE ORDERS (OUTPATIENT)
Facility: HOSPITAL | Age: 59
End: 2024-11-22

## 2024-11-22 ENCOUNTER — HOSPITAL ENCOUNTER (OUTPATIENT)
Facility: HOSPITAL | Age: 59
End: 2024-11-22
Payer: OTHER GOVERNMENT

## 2024-11-22 ENCOUNTER — HOSPITAL ENCOUNTER (OUTPATIENT)
Facility: HOSPITAL | Age: 59
Discharge: HOME | End: 2024-11-22
Attending: ORTHOPAEDIC SURGERY
Payer: OTHER GOVERNMENT

## 2024-11-22 DIAGNOSIS — M17.11 OSTEOARTHRITIS OF RIGHT KNEE, UNSPECIFIED OSTEOARTHRITIS TYPE: ICD-10-CM

## 2024-11-22 DIAGNOSIS — M17.11 OSTEOARTHRITIS OF RIGHT KNEE, UNSPECIFIED OSTEOARTHRITIS TYPE: Primary | ICD-10-CM

## 2024-11-22 LAB
ANION GAP SERPL CALC-SCNC: 4 MMOL/L (ref 3–18)
APPEARANCE UR: CLEAR
APTT PPP: 26.4 SEC (ref 23–36.4)
BASOPHILS # BLD: 0.1 K/UL (ref 0–0.1)
BASOPHILS NFR BLD: 1 % (ref 0–2)
BILIRUB UR QL: NEGATIVE
BUN SERPL-MCNC: 12 MG/DL (ref 7–18)
BUN/CREAT SERPL: 14 (ref 12–20)
CALCIUM SERPL-MCNC: 9.4 MG/DL (ref 8.5–10.1)
CHLORIDE SERPL-SCNC: 108 MMOL/L (ref 100–111)
CO2 SERPL-SCNC: 30 MMOL/L (ref 21–32)
COLOR UR: YELLOW
CREAT SERPL-MCNC: 0.88 MG/DL (ref 0.6–1.3)
DIFFERENTIAL METHOD BLD: ABNORMAL
EOSINOPHIL # BLD: 0.2 K/UL (ref 0–0.4)
EOSINOPHIL NFR BLD: 3 % (ref 0–5)
ERYTHROCYTE [DISTWIDTH] IN BLOOD BY AUTOMATED COUNT: 14.2 % (ref 11.6–14.5)
EST. AVERAGE GLUCOSE BLD GHB EST-MCNC: 117 MG/DL
GLUCOSE SERPL-MCNC: 95 MG/DL (ref 74–99)
GLUCOSE UR STRIP.AUTO-MCNC: NEGATIVE MG/DL
HBA1C MFR BLD: 5.7 % (ref 4.2–5.6)
HCG SERPL QL: NEGATIVE
HCT VFR BLD AUTO: 40.4 % (ref 35–45)
HGB BLD-MCNC: 13.2 G/DL (ref 12–16)
HGB UR QL STRIP: NEGATIVE
IMM GRANULOCYTES # BLD AUTO: 0 K/UL (ref 0–0.04)
IMM GRANULOCYTES NFR BLD AUTO: 0 % (ref 0–0.5)
INR PPP: 1 (ref 0.9–1.1)
KETONES UR QL STRIP.AUTO: NEGATIVE MG/DL
LEUKOCYTE ESTERASE UR QL STRIP.AUTO: NEGATIVE
LYMPHOCYTES # BLD: 2.8 K/UL (ref 0.9–3.6)
LYMPHOCYTES NFR BLD: 48 % (ref 21–52)
MCH RBC QN AUTO: 30.1 PG (ref 24–34)
MCHC RBC AUTO-ENTMCNC: 32.7 G/DL (ref 31–37)
MCV RBC AUTO: 92 FL (ref 78–100)
MONOCYTES # BLD: 0.5 K/UL (ref 0.05–1.2)
MONOCYTES NFR BLD: 9 % (ref 3–10)
NEUTS SEG # BLD: 2.3 K/UL (ref 1.8–8)
NEUTS SEG NFR BLD: 39 % (ref 40–73)
NITRITE UR QL STRIP.AUTO: NEGATIVE
NRBC # BLD: 0 K/UL (ref 0–0.01)
NRBC BLD-RTO: 0 PER 100 WBC
PH UR STRIP: 7.5 (ref 5–8)
PLATELET # BLD AUTO: 351 K/UL (ref 135–420)
PMV BLD AUTO: 9.4 FL (ref 9.2–11.8)
POTASSIUM SERPL-SCNC: 4.3 MMOL/L (ref 3.5–5.5)
PROT UR STRIP-MCNC: NEGATIVE MG/DL
PROTHROMBIN TIME: 13.1 SEC (ref 11.9–14.9)
RBC # BLD AUTO: 4.39 M/UL (ref 4.2–5.3)
SODIUM SERPL-SCNC: 142 MMOL/L (ref 136–145)
SP GR UR REFRACTOMETRY: 1.02 (ref 1–1.03)
UROBILINOGEN UR QL STRIP.AUTO: 1 EU/DL (ref 0.2–1)
WBC # BLD AUTO: 5.8 K/UL (ref 4.6–13.2)

## 2024-11-22 PROCEDURE — 81003 URINALYSIS AUTO W/O SCOPE: CPT

## 2024-11-22 PROCEDURE — 85730 THROMBOPLASTIN TIME PARTIAL: CPT

## 2024-11-22 PROCEDURE — 87086 URINE CULTURE/COLONY COUNT: CPT

## 2024-11-22 PROCEDURE — 73700 CT LOWER EXTREMITY W/O DYE: CPT

## 2024-11-22 PROCEDURE — 84703 CHORIONIC GONADOTROPIN ASSAY: CPT

## 2024-11-22 PROCEDURE — 80048 BASIC METABOLIC PNL TOTAL CA: CPT

## 2024-11-22 PROCEDURE — 71045 X-RAY EXAM CHEST 1 VIEW: CPT

## 2024-11-22 PROCEDURE — 85025 COMPLETE CBC W/AUTO DIFF WBC: CPT

## 2024-11-22 PROCEDURE — 36415 COLL VENOUS BLD VENIPUNCTURE: CPT

## 2024-11-22 PROCEDURE — 93005 ELECTROCARDIOGRAM TRACING: CPT

## 2024-11-22 PROCEDURE — 85610 PROTHROMBIN TIME: CPT

## 2024-11-22 PROCEDURE — 83036 HEMOGLOBIN GLYCOSYLATED A1C: CPT

## 2024-11-23 LAB
BACTERIA SPEC CULT: NORMAL
SERVICE CMNT-IMP: NORMAL
SERVICE CMNT-IMP: NORMAL

## 2024-11-24 LAB
EKG ATRIAL RATE: 73 BPM
EKG DIAGNOSIS: NORMAL
EKG P AXIS: 78 DEGREES
EKG P-R INTERVAL: 162 MS
EKG Q-T INTERVAL: 404 MS
EKG QRS DURATION: 74 MS
EKG QTC CALCULATION (BAZETT): 445 MS
EKG R AXIS: 40 DEGREES
EKG T AXIS: 54 DEGREES
EKG VENTRICULAR RATE: 73 BPM

## 2024-12-05 ENCOUNTER — HOSPITAL ENCOUNTER (OUTPATIENT)
Facility: HOSPITAL | Age: 59
Discharge: HOME | End: 2024-12-08
Payer: OTHER GOVERNMENT

## 2024-12-05 ENCOUNTER — TRANSCRIBE ORDERS (OUTPATIENT)
Facility: HOSPITAL | Age: 59
End: 2024-12-05

## 2024-12-05 ENCOUNTER — HOSPITAL ENCOUNTER (OUTPATIENT)
Facility: HOSPITAL | Age: 59
Setting detail: RECURRING SERIES
Discharge: HOME | End: 2024-12-08
Payer: OTHER GOVERNMENT

## 2024-12-05 DIAGNOSIS — M17.11 PRIMARY OSTEOARTHRITIS OF RIGHT KNEE: Primary | ICD-10-CM

## 2024-12-05 DIAGNOSIS — M17.11 PRIMARY OSTEOARTHRITIS OF RIGHT KNEE: ICD-10-CM

## 2024-12-05 PROCEDURE — 97161 PT EVAL LOW COMPLEX 20 MIN: CPT

## 2024-12-05 PROCEDURE — 86901 BLOOD TYPING SEROLOGIC RH(D): CPT

## 2024-12-05 PROCEDURE — 36415 COLL VENOUS BLD VENIPUNCTURE: CPT

## 2024-12-05 PROCEDURE — 97110 THERAPEUTIC EXERCISES: CPT

## 2024-12-05 PROCEDURE — 86850 RBC ANTIBODY SCREEN: CPT

## 2024-12-05 PROCEDURE — 97535 SELF CARE MNGMENT TRAINING: CPT

## 2024-12-05 PROCEDURE — 86900 BLOOD TYPING SEROLOGIC ABO: CPT

## 2024-12-05 NOTE — PROGRESS NOTES
[facility] at [facility] [place] [address]  Ph [phone]  Fx [phone]    Plan of Care/ Statement of Necessity for Physical Therapy Services      Patient name: Paige Barahona Start of Care: 2024   Referral source: Chau Banuelos MD : 1965    Medical Diagnosis: Pain in right knee [M25.561]   Onset Date: 2021    Treatment Diagnosis: M25.561  RIGHT KNEE PAIN      Prior Hospitalization: see medical history Provider#: [id number]     Comorbidities: Other: Left knee replacement 2024, hysterectomy ~ 10 years ago, tummy tuck and breast aug 8 years ago, hiatal hernia repair  and , right knee meniscus repair , OA    Prior Level of Function:   [x] Unrestricted with functional activities and ADL's  [x] No assistive device  [x] active lifestyle, [] moderately active lifestyle, [] sedentary lifestyle      The Plan of Care and following information is based on the information from the initial evaluation.  Assessment/ key information: Patient is a pleasant 59 y.o. female with c/o right knee pain that has been ongoing for approximately 2-3 years years.  Patient is with c/o pain and/or difficulty with, but not limited to, prolonged walking and recreational exercise. Because of this, patient has elected to have a right knee replacement by Dr. Banuelos that is scheduled for 2024.      Today's evaluative findings revealed patient to have impaired posture, impaired gait mechanics, decreased ROM, decreased LE strength, decreased core stabilization, and functional deficits as noted above.  Patient tolerated today's evaluation well.  Patient was educated on their condition, edema reduction techniques, signs/symptoms of infection and DVT's, as well as the POC.  All questions have been answered and patient has been given an HEP.  Patient would benefit from skilled PT services to modify and progress therapeutic interventions, address functional mobility 
    [x]  Patient Education billed concurrently with other procedures   [x] Review HEP    [] Progressed/Changed HEP, detail:    [] Other detail:       Physical Therapy Evaluation:    Inspection:   [x] Patient in no apparent distress                   [] Patient in obvious distress  [] Incision(s)/Portal Site(s): no visible signs/symptoms of infection  [x] Patient educated on signs/symptoms of infection and advised to contact MD's office if signs/symptoms occur.      Gait: mild antalgic    AROM:   Left Knee: 3- 90 degs  Right Knee: 4-118 degs    PROM:   Left Knee: 2-95 degs  Right Knee: 4-120 degs    MMT: Bilateral LE's grossly 4+/5 bilaterally    Sensation: [x] WNL bilaterally    Special Tests: 30\" STS: NT    Other Comments: none      ASSESSMENT:  Patient is a pleasant 59 y.o. female with c/o right knee pain that has been ongoing for approximately 2-3 years years.  Patient is with c/o pain and/or difficulty with, but not limited to, prolonged walking and recreational exercise. Because of this, patient has elected to have a right knee replacement by Dr. Banuelos that is scheduled for 12/11/2024.     Today's evaluative findings revealed patient to have impaired posture, impaired gait mechanics, decreased ROM, decreased LE strength, decreased core stabilization, and functional deficits as noted above.  Patient tolerated today's evaluation well.  Patient was educated on their condition, edema reduction techniques, signs/symptoms of infection and DVT's, as well as the POC.  All questions have been answered and patient has been given an HEP.  Patient would benefit from skilled PT services to modify and progress therapeutic interventions, address functional mobility deficits, address ROM deficits, address strength deficits, analyze and address soft tissue restrictions, analyze and cue movement patterns, analyze and modify body mechanics/ergonomics, and assess and modify postural abnormalities to attain remaining goals.

## 2024-12-08 LAB
ABO + RH BLD: NORMAL
BLOOD GROUP ANTIBODIES SERPL: NORMAL
SPECIMEN EXP DATE BLD: NORMAL

## 2024-12-10 ENCOUNTER — ANESTHESIA EVENT (OUTPATIENT)
Facility: HOSPITAL | Age: 59
DRG: 470 | End: 2024-12-10
Payer: OTHER GOVERNMENT

## 2024-12-11 ENCOUNTER — HOSPITAL ENCOUNTER (INPATIENT)
Facility: HOSPITAL | Age: 59
LOS: 1 days | Discharge: HOME | DRG: 470 | End: 2024-12-12
Attending: ORTHOPAEDIC SURGERY | Admitting: ORTHOPAEDIC SURGERY
Payer: OTHER GOVERNMENT

## 2024-12-11 ENCOUNTER — APPOINTMENT (OUTPATIENT)
Facility: HOSPITAL | Age: 59
DRG: 470 | End: 2024-12-11
Attending: ORTHOPAEDIC SURGERY
Payer: OTHER GOVERNMENT

## 2024-12-11 ENCOUNTER — ANESTHESIA (OUTPATIENT)
Facility: HOSPITAL | Age: 59
DRG: 470 | End: 2024-12-11
Payer: OTHER GOVERNMENT

## 2024-12-11 DIAGNOSIS — M17.11 UNILATERAL PRIMARY OSTEOARTHRITIS, RIGHT KNEE: Primary | ICD-10-CM

## 2024-12-11 LAB — GLUCOSE BLD STRIP.AUTO-MCNC: 92 MG/DL (ref 70–110)

## 2024-12-11 PROCEDURE — 6360000002 HC RX W HCPCS: Performed by: SPECIALIST

## 2024-12-11 PROCEDURE — C9290 INJ, BUPIVACAINE LIPOSOME: HCPCS | Performed by: ORTHOPAEDIC SURGERY

## 2024-12-11 PROCEDURE — 2709999900 HC NON-CHARGEABLE SUPPLY: Performed by: ORTHOPAEDIC SURGERY

## 2024-12-11 PROCEDURE — 2500000003 HC RX 250 WO HCPCS: Performed by: NURSE ANESTHETIST, CERTIFIED REGISTERED

## 2024-12-11 PROCEDURE — 7100000001 HC PACU RECOVERY - ADDTL 15 MIN: Performed by: ORTHOPAEDIC SURGERY

## 2024-12-11 PROCEDURE — 1100000000 HC RM PRIVATE

## 2024-12-11 PROCEDURE — 6370000000 HC RX 637 (ALT 250 FOR IP): Performed by: SPECIALIST

## 2024-12-11 PROCEDURE — 3600000005 HC SURGERY LEVEL 5 BASE: Performed by: ORTHOPAEDIC SURGERY

## 2024-12-11 PROCEDURE — 2580000003 HC RX 258: Performed by: ORTHOPAEDIC SURGERY

## 2024-12-11 PROCEDURE — 6360000002 HC RX W HCPCS: Performed by: ORTHOPAEDIC SURGERY

## 2024-12-11 PROCEDURE — 6360000002 HC RX W HCPCS: Performed by: NURSE ANESTHETIST, CERTIFIED REGISTERED

## 2024-12-11 PROCEDURE — 8E0Y0CZ ROBOTIC ASSISTED PROCEDURE OF LOWER EXTREMITY, OPEN APPROACH: ICD-10-PCS | Performed by: ORTHOPAEDIC SURGERY

## 2024-12-11 PROCEDURE — 73560 X-RAY EXAM OF KNEE 1 OR 2: CPT

## 2024-12-11 PROCEDURE — 7100000000 HC PACU RECOVERY - FIRST 15 MIN: Performed by: ORTHOPAEDIC SURGERY

## 2024-12-11 PROCEDURE — 6370000000 HC RX 637 (ALT 250 FOR IP): Performed by: ORTHOPAEDIC SURGERY

## 2024-12-11 PROCEDURE — 2720000010 HC SURG SUPPLY STERILE: Performed by: ORTHOPAEDIC SURGERY

## 2024-12-11 PROCEDURE — 3600000013 HC SURGERY LEVEL 3 ADDTL 15MIN: Performed by: ORTHOPAEDIC SURGERY

## 2024-12-11 PROCEDURE — C1713 ANCHOR/SCREW BN/BN,TIS/BN: HCPCS | Performed by: ORTHOPAEDIC SURGERY

## 2024-12-11 PROCEDURE — 3600000015 HC SURGERY LEVEL 5 ADDTL 15MIN: Performed by: ORTHOPAEDIC SURGERY

## 2024-12-11 PROCEDURE — 82962 GLUCOSE BLOOD TEST: CPT

## 2024-12-11 PROCEDURE — 3700000001 HC ADD 15 MINUTES (ANESTHESIA): Performed by: ORTHOPAEDIC SURGERY

## 2024-12-11 PROCEDURE — 2580000003 HC RX 258: Performed by: NURSE ANESTHETIST, CERTIFIED REGISTERED

## 2024-12-11 PROCEDURE — 2500000003 HC RX 250 WO HCPCS: Performed by: ORTHOPAEDIC SURGERY

## 2024-12-11 PROCEDURE — 0SRC0JA REPLACEMENT OF RIGHT KNEE JOINT WITH SYNTHETIC SUBSTITUTE, UNCEMENTED, OPEN APPROACH: ICD-10-PCS | Performed by: ORTHOPAEDIC SURGERY

## 2024-12-11 PROCEDURE — 2500000003 HC RX 250 WO HCPCS: Performed by: SPECIALIST

## 2024-12-11 PROCEDURE — 3600000003 HC SURGERY LEVEL 3 BASE: Performed by: ORTHOPAEDIC SURGERY

## 2024-12-11 PROCEDURE — 3700000000 HC ANESTHESIA ATTENDED CARE: Performed by: ORTHOPAEDIC SURGERY

## 2024-12-11 PROCEDURE — 64447 NJX AA&/STRD FEMORAL NRV IMG: CPT | Performed by: SPECIALIST

## 2024-12-11 PROCEDURE — 97162 PT EVAL MOD COMPLEX 30 MIN: CPT

## 2024-12-11 PROCEDURE — C1776 JOINT DEVICE (IMPLANTABLE): HCPCS | Performed by: ORTHOPAEDIC SURGERY

## 2024-12-11 DEVICE — TIBIAL BEARING INSERT - CS
Type: IMPLANTABLE DEVICE | Site: KNEE | Status: FUNCTIONAL
Brand: TRIATHLON

## 2024-12-11 DEVICE — CRUCIATE RETAINING FEMORAL
Type: IMPLANTABLE DEVICE | Site: KNEE | Status: FUNCTIONAL
Brand: TRIATHLON

## 2024-12-11 DEVICE — ASYMMETRIC PATELLA
Type: IMPLANTABLE DEVICE | Site: KNEE | Status: FUNCTIONAL
Brand: TRIATHLON

## 2024-12-11 DEVICE — SPEEDSET FULL DOSE ANTIBIOTIC BONE CEMENT, 10 PACK CATALOG NUMBER IS 6192-1-010
Type: IMPLANTABLE DEVICE | Site: KNEE | Status: FUNCTIONAL
Brand: SIMPLEX

## 2024-12-11 DEVICE — TIBIAL COMPONENT
Type: IMPLANTABLE DEVICE | Site: KNEE | Status: FUNCTIONAL
Brand: TRIATHLON

## 2024-12-11 RX ORDER — SODIUM CHLORIDE 9 MG/ML
INJECTION, SOLUTION INTRAVENOUS PRN
Status: DISCONTINUED | OUTPATIENT
Start: 2024-12-11 | End: 2024-12-11 | Stop reason: HOSPADM

## 2024-12-11 RX ORDER — SODIUM CHLORIDE 0.9 % (FLUSH) 0.9 %
5-40 SYRINGE (ML) INJECTION EVERY 12 HOURS SCHEDULED
Status: DISCONTINUED | OUTPATIENT
Start: 2024-12-11 | End: 2024-12-11 | Stop reason: HOSPADM

## 2024-12-11 RX ORDER — MIDAZOLAM HYDROCHLORIDE 1 MG/ML
INJECTION, SOLUTION INTRAMUSCULAR; INTRAVENOUS
Status: COMPLETED | OUTPATIENT
Start: 2024-12-11 | End: 2024-12-11

## 2024-12-11 RX ORDER — SODIUM CHLORIDE 0.9 % (FLUSH) 0.9 %
5-40 SYRINGE (ML) INJECTION PRN
Status: DISCONTINUED | OUTPATIENT
Start: 2024-12-11 | End: 2024-12-11 | Stop reason: HOSPADM

## 2024-12-11 RX ORDER — SODIUM CHLORIDE, SODIUM LACTATE, POTASSIUM CHLORIDE, CALCIUM CHLORIDE 600; 310; 30; 20 MG/100ML; MG/100ML; MG/100ML; MG/100ML
INJECTION, SOLUTION INTRAVENOUS
Status: DISCONTINUED | OUTPATIENT
Start: 2024-12-11 | End: 2024-12-11 | Stop reason: SDUPTHER

## 2024-12-11 RX ORDER — ONDANSETRON 4 MG/1
4 TABLET, ORALLY DISINTEGRATING ORAL EVERY 8 HOURS PRN
Status: DISCONTINUED | OUTPATIENT
Start: 2024-12-11 | End: 2024-12-12 | Stop reason: HOSPADM

## 2024-12-11 RX ORDER — FLUTICASONE PROPIONATE 50 MCG
1 SPRAY, SUSPENSION (ML) NASAL DAILY PRN
Status: DISCONTINUED | OUTPATIENT
Start: 2024-12-11 | End: 2024-12-12 | Stop reason: HOSPADM

## 2024-12-11 RX ORDER — HYDROMORPHONE HYDROCHLORIDE 1 MG/ML
0.5 INJECTION, SOLUTION INTRAMUSCULAR; INTRAVENOUS; SUBCUTANEOUS
Status: DISCONTINUED | OUTPATIENT
Start: 2024-12-11 | End: 2024-12-12 | Stop reason: HOSPADM

## 2024-12-11 RX ORDER — ROPIVACAINE HYDROCHLORIDE 5 MG/ML
INJECTION, SOLUTION EPIDURAL; INFILTRATION; PERINEURAL
Status: COMPLETED | OUTPATIENT
Start: 2024-12-11 | End: 2024-12-11

## 2024-12-11 RX ORDER — NALOXONE HYDROCHLORIDE 0.4 MG/ML
INJECTION, SOLUTION INTRAMUSCULAR; INTRAVENOUS; SUBCUTANEOUS PRN
Status: DISCONTINUED | OUTPATIENT
Start: 2024-12-11 | End: 2024-12-11 | Stop reason: HOSPADM

## 2024-12-11 RX ORDER — HYDROMORPHONE HYDROCHLORIDE 1 MG/ML
0.25 INJECTION, SOLUTION INTRAMUSCULAR; INTRAVENOUS; SUBCUTANEOUS EVERY 5 MIN PRN
Status: DISCONTINUED | OUTPATIENT
Start: 2024-12-11 | End: 2024-12-11 | Stop reason: HOSPADM

## 2024-12-11 RX ORDER — OXYCODONE HYDROCHLORIDE 5 MG/1
5 TABLET ORAL EVERY 4 HOURS PRN
Status: DISCONTINUED | OUTPATIENT
Start: 2024-12-11 | End: 2024-12-12 | Stop reason: HOSPADM

## 2024-12-11 RX ORDER — CETIRIZINE HYDROCHLORIDE 10 MG/1
10 TABLET ORAL DAILY
Status: DISCONTINUED | OUTPATIENT
Start: 2024-12-11 | End: 2024-12-12 | Stop reason: HOSPADM

## 2024-12-11 RX ORDER — POLYETHYLENE GLYCOL 3350 17 G/17G
17 POWDER, FOR SOLUTION ORAL DAILY
Status: DISCONTINUED | OUTPATIENT
Start: 2024-12-11 | End: 2024-12-12 | Stop reason: HOSPADM

## 2024-12-11 RX ORDER — ACETAMINOPHEN 500 MG
500 TABLET ORAL EVERY 6 HOURS PRN
Status: DISCONTINUED | OUTPATIENT
Start: 2024-12-11 | End: 2024-12-12 | Stop reason: HOSPADM

## 2024-12-11 RX ORDER — ONDANSETRON 2 MG/ML
4 INJECTION INTRAMUSCULAR; INTRAVENOUS EVERY 6 HOURS PRN
Status: DISCONTINUED | OUTPATIENT
Start: 2024-12-11 | End: 2024-12-12 | Stop reason: HOSPADM

## 2024-12-11 RX ORDER — ACETAMINOPHEN 325 MG/1
650 TABLET ORAL EVERY 6 HOURS
Status: DISCONTINUED | OUTPATIENT
Start: 2024-12-11 | End: 2024-12-12 | Stop reason: HOSPADM

## 2024-12-11 RX ORDER — ONDANSETRON 2 MG/ML
INJECTION INTRAMUSCULAR; INTRAVENOUS
Status: DISCONTINUED | OUTPATIENT
Start: 2024-12-11 | End: 2024-12-11 | Stop reason: SDUPTHER

## 2024-12-11 RX ORDER — DEXMEDETOMIDINE HYDROCHLORIDE 100 UG/ML
INJECTION, SOLUTION INTRAVENOUS
Status: COMPLETED
Start: 2024-12-11 | End: 2024-12-11

## 2024-12-11 RX ORDER — PROPOFOL 10 MG/ML
INJECTION, EMULSION INTRAVENOUS
Status: DISCONTINUED | OUTPATIENT
Start: 2024-12-11 | End: 2024-12-11 | Stop reason: SDUPTHER

## 2024-12-11 RX ORDER — OXYCODONE HYDROCHLORIDE 5 MG/1
5 TABLET ORAL
Status: DISCONTINUED | OUTPATIENT
Start: 2024-12-11 | End: 2024-12-11

## 2024-12-11 RX ORDER — SODIUM CHLORIDE, SODIUM LACTATE, POTASSIUM CHLORIDE, CALCIUM CHLORIDE 600; 310; 30; 20 MG/100ML; MG/100ML; MG/100ML; MG/100ML
INJECTION, SOLUTION INTRAVENOUS CONTINUOUS
Status: DISCONTINUED | OUTPATIENT
Start: 2024-12-11 | End: 2024-12-11 | Stop reason: HOSPADM

## 2024-12-11 RX ORDER — SODIUM CHLORIDE, SODIUM LACTATE, POTASSIUM CHLORIDE, AND CALCIUM CHLORIDE .6; .31; .03; .02 G/100ML; G/100ML; G/100ML; G/100ML
IRRIGANT IRRIGATION PRN
Status: DISCONTINUED | OUTPATIENT
Start: 2024-12-11 | End: 2024-12-11 | Stop reason: ALTCHOICE

## 2024-12-11 RX ORDER — TRANEXAMIC ACID 10 MG/ML
1000 INJECTION, SOLUTION INTRAVENOUS
Status: DISCONTINUED | OUTPATIENT
Start: 2024-12-11 | End: 2024-12-11 | Stop reason: HOSPADM

## 2024-12-11 RX ORDER — MIDAZOLAM HYDROCHLORIDE 1 MG/ML
INJECTION, SOLUTION INTRAMUSCULAR; INTRAVENOUS
Status: COMPLETED
Start: 2024-12-11 | End: 2024-12-11

## 2024-12-11 RX ORDER — KETOROLAC TROMETHAMINE 30 MG/ML
INJECTION, SOLUTION INTRAMUSCULAR; INTRAVENOUS
Status: DISCONTINUED | OUTPATIENT
Start: 2024-12-11 | End: 2024-12-11 | Stop reason: SDUPTHER

## 2024-12-11 RX ORDER — LABETALOL HYDROCHLORIDE 5 MG/ML
10 INJECTION, SOLUTION INTRAVENOUS
Status: DISCONTINUED | OUTPATIENT
Start: 2024-12-11 | End: 2024-12-11 | Stop reason: HOSPADM

## 2024-12-11 RX ORDER — ASPIRIN 325 MG
325 TABLET, DELAYED RELEASE (ENTERIC COATED) ORAL 2 TIMES DAILY
Status: DISCONTINUED | OUTPATIENT
Start: 2024-12-11 | End: 2024-12-12 | Stop reason: HOSPADM

## 2024-12-11 RX ORDER — VENLAFAXINE HYDROCHLORIDE 75 MG/1
75 CAPSULE, EXTENDED RELEASE ORAL DAILY
Status: DISCONTINUED | OUTPATIENT
Start: 2024-12-11 | End: 2024-12-12 | Stop reason: HOSPADM

## 2024-12-11 RX ORDER — LIDOCAINE HYDROCHLORIDE 20 MG/ML
INJECTION, SOLUTION INTRAVENOUS
Status: DISCONTINUED | OUTPATIENT
Start: 2024-12-11 | End: 2024-12-11 | Stop reason: SDUPTHER

## 2024-12-11 RX ORDER — SODIUM CHLORIDE 9 MG/ML
INJECTION, SOLUTION INTRAVENOUS PRN
Status: DISCONTINUED | OUTPATIENT
Start: 2024-12-11 | End: 2024-12-12 | Stop reason: HOSPADM

## 2024-12-11 RX ORDER — DEXMEDETOMIDINE HYDROCHLORIDE 100 UG/ML
INJECTION, SOLUTION INTRAVENOUS
Status: COMPLETED | OUTPATIENT
Start: 2024-12-11 | End: 2024-12-11

## 2024-12-11 RX ORDER — DIPHENHYDRAMINE HCL 25 MG
25 CAPSULE ORAL EVERY 6 HOURS PRN
Status: DISCONTINUED | OUTPATIENT
Start: 2024-12-11 | End: 2024-12-12 | Stop reason: HOSPADM

## 2024-12-11 RX ORDER — DROPERIDOL 2.5 MG/ML
0.62 INJECTION, SOLUTION INTRAMUSCULAR; INTRAVENOUS
Status: DISCONTINUED | OUTPATIENT
Start: 2024-12-11 | End: 2024-12-11 | Stop reason: HOSPADM

## 2024-12-11 RX ORDER — PHENYLEPHRINE HCL IN 0.9% NACL 1 MG/10 ML
SYRINGE (ML) INTRAVENOUS
Status: DISCONTINUED | OUTPATIENT
Start: 2024-12-11 | End: 2024-12-11 | Stop reason: SDUPTHER

## 2024-12-11 RX ORDER — SODIUM CHLORIDE 0.9 % (FLUSH) 0.9 %
5-40 SYRINGE (ML) INJECTION EVERY 12 HOURS SCHEDULED
Status: DISCONTINUED | OUTPATIENT
Start: 2024-12-11 | End: 2024-12-12 | Stop reason: HOSPADM

## 2024-12-11 RX ORDER — GLYCOPYRROLATE 0.2 MG/ML
INJECTION INTRAMUSCULAR; INTRAVENOUS
Status: DISCONTINUED | OUTPATIENT
Start: 2024-12-11 | End: 2024-12-11 | Stop reason: SDUPTHER

## 2024-12-11 RX ORDER — OXYCODONE HYDROCHLORIDE 5 MG/1
10 TABLET ORAL EVERY 4 HOURS PRN
Status: DISCONTINUED | OUTPATIENT
Start: 2024-12-11 | End: 2024-12-12 | Stop reason: HOSPADM

## 2024-12-11 RX ORDER — ONDANSETRON 2 MG/ML
4 INJECTION INTRAMUSCULAR; INTRAVENOUS
Status: DISCONTINUED | OUTPATIENT
Start: 2024-12-11 | End: 2024-12-11 | Stop reason: HOSPADM

## 2024-12-11 RX ORDER — TRANEXAMIC ACID 10 MG/ML
INJECTION, SOLUTION INTRAVENOUS
Status: DISCONTINUED | OUTPATIENT
Start: 2024-12-11 | End: 2024-12-11 | Stop reason: SDUPTHER

## 2024-12-11 RX ORDER — DIPHENHYDRAMINE HYDROCHLORIDE 50 MG/ML
12.5 INJECTION INTRAMUSCULAR; INTRAVENOUS
Status: DISCONTINUED | OUTPATIENT
Start: 2024-12-11 | End: 2024-12-11 | Stop reason: HOSPADM

## 2024-12-11 RX ORDER — SODIUM CHLORIDE 0.9 % (FLUSH) 0.9 %
5-40 SYRINGE (ML) INJECTION PRN
Status: DISCONTINUED | OUTPATIENT
Start: 2024-12-11 | End: 2024-12-12 | Stop reason: HOSPADM

## 2024-12-11 RX ORDER — DEXAMETHASONE SODIUM PHOSPHATE 4 MG/ML
INJECTION, SOLUTION INTRA-ARTICULAR; INTRALESIONAL; INTRAMUSCULAR; INTRAVENOUS; SOFT TISSUE
Status: DISCONTINUED | OUTPATIENT
Start: 2024-12-11 | End: 2024-12-11 | Stop reason: SDUPTHER

## 2024-12-11 RX ORDER — SODIUM CHLORIDE 9 MG/ML
INJECTION, SOLUTION INTRAVENOUS CONTINUOUS
Status: DISCONTINUED | OUTPATIENT
Start: 2024-12-11 | End: 2024-12-12 | Stop reason: HOSPADM

## 2024-12-11 RX ORDER — VANCOMYCIN HYDROCHLORIDE 1 G/20ML
INJECTION, POWDER, LYOPHILIZED, FOR SOLUTION INTRAVENOUS PRN
Status: DISCONTINUED | OUTPATIENT
Start: 2024-12-11 | End: 2024-12-11 | Stop reason: ALTCHOICE

## 2024-12-11 RX ORDER — ACETAMINOPHEN 500 MG
1000 TABLET ORAL ONCE
Status: DISCONTINUED | OUTPATIENT
Start: 2024-12-11 | End: 2024-12-11

## 2024-12-11 RX ORDER — SENNOSIDES A AND B 8.6 MG/1
1 TABLET, FILM COATED ORAL DAILY PRN
Status: DISCONTINUED | OUTPATIENT
Start: 2024-12-11 | End: 2024-12-12 | Stop reason: HOSPADM

## 2024-12-11 RX ORDER — ACETAMINOPHEN 500 MG
1000 TABLET ORAL ONCE
Status: COMPLETED | OUTPATIENT
Start: 2024-12-11 | End: 2024-12-11

## 2024-12-11 RX ORDER — SODIUM CHLORIDE, SODIUM LACTATE, POTASSIUM CHLORIDE, CALCIUM CHLORIDE 600; 310; 30; 20 MG/100ML; MG/100ML; MG/100ML; MG/100ML
INJECTION, SOLUTION INTRAVENOUS CONTINUOUS
Status: DISCONTINUED | OUTPATIENT
Start: 2024-12-11 | End: 2024-12-12 | Stop reason: HOSPADM

## 2024-12-11 RX ORDER — DIPHENHYDRAMINE HYDROCHLORIDE 50 MG/ML
25 INJECTION INTRAMUSCULAR; INTRAVENOUS EVERY 6 HOURS PRN
Status: DISCONTINUED | OUTPATIENT
Start: 2024-12-11 | End: 2024-12-12 | Stop reason: HOSPADM

## 2024-12-11 RX ORDER — EPHEDRINE SULFATE 5 MG/ML
INJECTION INTRAVENOUS
Status: DISCONTINUED | OUTPATIENT
Start: 2024-12-11 | End: 2024-12-11 | Stop reason: SDUPTHER

## 2024-12-11 RX ORDER — FENTANYL CITRATE 50 UG/ML
25 INJECTION, SOLUTION INTRAMUSCULAR; INTRAVENOUS EVERY 5 MIN PRN
Status: DISCONTINUED | OUTPATIENT
Start: 2024-12-11 | End: 2024-12-11 | Stop reason: HOSPADM

## 2024-12-11 RX ORDER — MONTELUKAST SODIUM 10 MG/1
10 TABLET ORAL DAILY
Status: DISCONTINUED | OUTPATIENT
Start: 2024-12-11 | End: 2024-12-12 | Stop reason: HOSPADM

## 2024-12-11 RX ADMIN — WATER 1000 MG: 1 INJECTION INTRAMUSCULAR; INTRAVENOUS; SUBCUTANEOUS at 23:47

## 2024-12-11 RX ADMIN — OXYCODONE 10 MG: 5 TABLET ORAL at 18:33

## 2024-12-11 RX ADMIN — Medication 10 MG: at 09:32

## 2024-12-11 RX ADMIN — HYDROMORPHONE HYDROCHLORIDE 0.5 MG: 1 INJECTION, SOLUTION INTRAMUSCULAR; INTRAVENOUS; SUBCUTANEOUS at 08:18

## 2024-12-11 RX ADMIN — DEXAMETHASONE SODIUM PHOSPHATE 8 MG: 4 INJECTION, SOLUTION INTRAMUSCULAR; INTRAVENOUS at 08:28

## 2024-12-11 RX ADMIN — FENTANYL CITRATE 25 MCG: 50 INJECTION INTRAMUSCULAR; INTRAVENOUS at 11:18

## 2024-12-11 RX ADMIN — ACETAMINOPHEN 650 MG: 325 TABLET ORAL at 13:11

## 2024-12-11 RX ADMIN — VENLAFAXINE HYDROCHLORIDE 75 MG: 75 CAPSULE, EXTENDED RELEASE ORAL at 15:01

## 2024-12-11 RX ADMIN — MIDAZOLAM 2 MG: 1 INJECTION INTRAMUSCULAR; INTRAVENOUS at 08:04

## 2024-12-11 RX ADMIN — Medication 100 MCG: at 08:25

## 2024-12-11 RX ADMIN — KETOROLAC TROMETHAMINE 15 MG: 30 INJECTION, SOLUTION INTRAMUSCULAR; INTRAVENOUS at 10:35

## 2024-12-11 RX ADMIN — Medication 10 MG: at 10:23

## 2024-12-11 RX ADMIN — SODIUM CHLORIDE: 9 INJECTION, SOLUTION INTRAVENOUS at 06:41

## 2024-12-11 RX ADMIN — ASPIRIN 325 MG: 325 TABLET, COATED ORAL at 23:42

## 2024-12-11 RX ADMIN — SODIUM CHLORIDE, POTASSIUM CHLORIDE, SODIUM LACTATE AND CALCIUM CHLORIDE: 600; 310; 30; 20 INJECTION, SOLUTION INTRAVENOUS at 16:28

## 2024-12-11 RX ADMIN — TRANEXAMIC ACID 1 G: 10 INJECTION, SOLUTION INTRAVENOUS at 08:24

## 2024-12-11 RX ADMIN — CETIRIZINE HYDROCHLORIDE 10 MG: 10 TABLET, FILM COATED ORAL at 13:11

## 2024-12-11 RX ADMIN — SODIUM CHLORIDE, POTASSIUM CHLORIDE, SODIUM LACTATE AND CALCIUM CHLORIDE: 600; 310; 30; 20 INJECTION, SOLUTION INTRAVENOUS at 12:01

## 2024-12-11 RX ADMIN — EPHEDRINE SULFATE 10 MG: 5 INJECTION INTRAVENOUS at 08:35

## 2024-12-11 RX ADMIN — ROPIVACAINE HYDROCHLORIDE 20 ML: 5 INJECTION, SOLUTION EPIDURAL; INFILTRATION; PERINEURAL at 07:28

## 2024-12-11 RX ADMIN — ACETAMINOPHEN 650 MG: 325 TABLET ORAL at 18:33

## 2024-12-11 RX ADMIN — ONDANSETRON HYDROCHLORIDE 4 MG: 2 INJECTION INTRAMUSCULAR; INTRAVENOUS at 10:35

## 2024-12-11 RX ADMIN — Medication 30 MG: at 08:18

## 2024-12-11 RX ADMIN — OXYCODONE 10 MG: 5 TABLET ORAL at 13:11

## 2024-12-11 RX ADMIN — MIDAZOLAM 2 MG: 1 INJECTION INTRAMUSCULAR; INTRAVENOUS at 07:24

## 2024-12-11 RX ADMIN — EPHEDRINE SULFATE 10 MG: 5 INJECTION INTRAVENOUS at 08:21

## 2024-12-11 RX ADMIN — SODIUM CHLORIDE, SODIUM LACTATE, POTASSIUM CHLORIDE, AND CALCIUM CHLORIDE: 600; 310; 30; 20 INJECTION, SOLUTION INTRAVENOUS at 08:51

## 2024-12-11 RX ADMIN — GLYCOPYRROLATE 0.2 MG: 0.2 INJECTION INTRAMUSCULAR; INTRAVENOUS at 08:38

## 2024-12-11 RX ADMIN — ACETAMINOPHEN 650 MG: 325 TABLET ORAL at 23:41

## 2024-12-11 RX ADMIN — LIDOCAINE HYDROCHLORIDE 60 MG: 20 INJECTION, SOLUTION INTRAVENOUS at 08:10

## 2024-12-11 RX ADMIN — SODIUM CHLORIDE, POTASSIUM CHLORIDE, SODIUM LACTATE AND CALCIUM CHLORIDE: 600; 310; 30; 20 INJECTION, SOLUTION INTRAVENOUS at 11:15

## 2024-12-11 RX ADMIN — EPHEDRINE SULFATE 10 MG: 5 INJECTION INTRAVENOUS at 08:37

## 2024-12-11 RX ADMIN — MONTELUKAST 10 MG: 10 TABLET, FILM COATED ORAL at 13:11

## 2024-12-11 RX ADMIN — DEXMEDETOMIDINE HYDROCHLORIDE 0.2 ML: 100 INJECTION, SOLUTION INTRAVENOUS at 07:28

## 2024-12-11 RX ADMIN — SODIUM CHLORIDE, POTASSIUM CHLORIDE, SODIUM LACTATE AND CALCIUM CHLORIDE: 600; 310; 30; 20 INJECTION, SOLUTION INTRAVENOUS at 23:47

## 2024-12-11 RX ADMIN — Medication 100 MCG: at 08:38

## 2024-12-11 RX ADMIN — POLYETHYLENE GLYCOL 3350 17 G: 17 POWDER, FOR SOLUTION ORAL at 13:12

## 2024-12-11 RX ADMIN — HYDROMORPHONE HYDROCHLORIDE 0.5 MG: 1 INJECTION, SOLUTION INTRAMUSCULAR; INTRAVENOUS; SUBCUTANEOUS at 09:32

## 2024-12-11 RX ADMIN — WATER 2000 MG: 1 INJECTION INTRAMUSCULAR; INTRAVENOUS; SUBCUTANEOUS at 08:18

## 2024-12-11 RX ADMIN — PROPOFOL 150 MG: 10 INJECTION, EMULSION INTRAVENOUS at 08:10

## 2024-12-11 RX ADMIN — ACETAMINOPHEN 1000 MG: 500 TABLET ORAL at 06:33

## 2024-12-11 RX ADMIN — FENTANYL CITRATE 25 MCG: 50 INJECTION INTRAMUSCULAR; INTRAVENOUS at 11:13

## 2024-12-11 ASSESSMENT — PAIN DESCRIPTION - LOCATION
LOCATION: KNEE

## 2024-12-11 ASSESSMENT — PAIN DESCRIPTION - PAIN TYPE
TYPE: SURGICAL PAIN

## 2024-12-11 ASSESSMENT — PAIN SCALES - GENERAL
PAINLEVEL_OUTOF10: 8
PAINLEVEL_OUTOF10: 6
PAINLEVEL_OUTOF10: 6
PAINLEVEL_OUTOF10: 9
PAINLEVEL_OUTOF10: 9
PAINLEVEL_OUTOF10: 8
PAINLEVEL_OUTOF10: 6
PAINLEVEL_OUTOF10: 9

## 2024-12-11 ASSESSMENT — PAIN DESCRIPTION - ORIENTATION
ORIENTATION: RIGHT
ORIENTATION: RIGHT;ANTERIOR
ORIENTATION: RIGHT

## 2024-12-11 ASSESSMENT — PAIN DESCRIPTION - DESCRIPTORS
DESCRIPTORS: ACHING;DISCOMFORT
DESCRIPTORS: ACHING;DISCOMFORT
DESCRIPTORS: ACHING
DESCRIPTORS: ACHING;DISCOMFORT
DESCRIPTORS: ACHING;DISCOMFORT
DESCRIPTORS: SORE
DESCRIPTORS: ACHING

## 2024-12-11 ASSESSMENT — PAIN - FUNCTIONAL ASSESSMENT
PAIN_FUNCTIONAL_ASSESSMENT: ACTIVITIES ARE NOT PREVENTED
PAIN_FUNCTIONAL_ASSESSMENT: 0-10
PAIN_FUNCTIONAL_ASSESSMENT: PREVENTS OR INTERFERES SOME ACTIVE ACTIVITIES AND ADLS
PAIN_FUNCTIONAL_ASSESSMENT: ACTIVITIES ARE NOT PREVENTED
PAIN_FUNCTIONAL_ASSESSMENT: PREVENTS OR INTERFERES SOME ACTIVE ACTIVITIES AND ADLS
PAIN_FUNCTIONAL_ASSESSMENT: PREVENTS OR INTERFERES SOME ACTIVE ACTIVITIES AND ADLS

## 2024-12-11 ASSESSMENT — PAIN DESCRIPTION - FREQUENCY
FREQUENCY: CONTINUOUS

## 2024-12-11 ASSESSMENT — PAIN DESCRIPTION - ONSET
ONSET: ON-GOING

## 2024-12-11 NOTE — PERIOP NOTE
Reviewed PTA medication list with patient/caregiver and patient/caregiver denies any additional medications.     Patient admits to having a responsible adult care for them at home for at least 24 hours after surgery.    Patient encouraged to use gown warming system and informed that using said warming gown to regulate body temperature prior to a procedure has been shown to help reduce the risks of blood clots and infection.    Patient's pharmacy of choice verified and documented in PTA medication section.    Dual skin assessment & fall risk band verification completed with Nidia CHAVIRA RN.

## 2024-12-11 NOTE — PERIOP NOTE
TRANSFER - IN REPORT:    Verbal report received from OR nurse and CRNA on Paige Barahona  being received from OR for routine progression of patient care      Report consisted of patient's Situation, Background, Assessment and   Recommendations(SBAR).     Information from the following report(s) Nurse Handoff Report was reviewed with the receiving nurse.    Opportunity for questions and clarification was provided.      Assessment completed upon patient's arrival to unit and care assumed.

## 2024-12-11 NOTE — OP NOTE
CRUCFRM - [id number]  BASEPLATE TIB SZ 3 AP44MM ML67MM KNEE TRITANIUM 4 CRUCFRM  [employer] [facility] [id number] Right 1 Implanted   COMPONENT FEM SZ 2 R KNEE CRUCE RET CEMENTLESS BEAD W/ JOANN - [id number]  COMPONENT FEM SZ 2 R KNEE CRUCE RET CEMENTLESS BEAD W/ JOANN  [employer] [facility] [id number] Right 1 Implanted   CEMENT BNE RADIOPAQUE SIMPLEX P SPEEDDET - [id number]  CEMENT BNE RADIOPAQUE SIMPLEX P SPEEDDET  [employer] [facility] [id number] Right 1 Implanted           Findings: severe tricompartment OA     Estimated Blood Loss:   50 cc         Drains: NA     Range of Motion: 0-135 deg           Specimens:  None     Complications: None

## 2024-12-11 NOTE — ANESTHESIA PRE PROCEDURE
Cardiovascular:Negative CV ROS  Exercise tolerance: good (>4 METS)                    Neuro/Psych:   Negative Neuro/Psych ROS              GI/Hepatic/Renal: Neg GI/Hepatic/Renal ROS       (-) GERD       Endo/Other: Negative Endo/Other ROS                    Abdominal:             Vascular: negative vascular ROS.         Other Findings:             Anesthesia Plan      general     ASA 1       Induction: intravenous.    MIPS: Postoperative opioids intended.  Anesthetic plan and risks discussed with patient.      Plan discussed with CRNA.    Attending anesthesiologist reviewed and agrees with Preprocedure content      Post-op pain plan if not by surgeon: single peripheral nerve block      Not interested in spinal - had GA for contralateral knee.    ACB - risks/benefits explained: infection, nerve injury, bleeding, failed block - she wishes to proceed.       Carlos Bearden MD   12/11/2024

## 2024-12-11 NOTE — PERIOP NOTE
TRANSFER - OUT REPORT:    Verbal report given to Brittney RN on Paige Barahona  being transferred to [facility] for routine progression of patient care       Report consisted of patient's Situation, Background, Assessment and   Recommendations(SBAR).     Information from the following report(s) Nurse Handoff Report was reviewed with the receiving nurse.           Lines:   Peripheral IV 12/11/24 Left;Ventral Forearm (Active)   Site Assessment Clean, dry & intact 12/11/24 1133   Line Status Infusing 12/11/24 1133   Line Care Connections checked and tightened 12/11/24 1133   Phlebitis Assessment No symptoms 12/11/24 1133   Infiltration Assessment 0 12/11/24 1133   Alcohol Cap Used No 12/11/24 1133   Dressing Status Clean, dry & intact 12/11/24 1133   Dressing Type Transparent 12/11/24 1133   Dressing Intervention New 12/11/24 0742        Opportunity for questions and clarification was provided.      Patient transported with:  Registered Nurse

## 2024-12-11 NOTE — ANESTHESIA POSTPROCEDURE EVALUATION
Post-Anesthesia Evaluation and Assessment    Cardiovascular Function/Vital Signs/Pain Score:      Patient is status post Procedure(s):  RIGHT TOTAL KNEE ARTHROPLASTY WITH C-ARM MAKOPLASTY.    Nausea/Vomiting: Controlled.    Postoperative hydration reviewed and adequate.    Pain:  Managed    Mental Status and Level of Consciousness: Baseline and appropriate for discharge.    Adequate oxygenation and airway patent.    Complications related to anesthesia: None    Post-anesthesia assessment completed. No concerns.    Patient has met all discharge requirements.    Signed By: Carlos Bearden MD    December 11, 2024

## 2024-12-11 NOTE — INTERVAL H&P NOTE
Update History & Physical    The patient's History and Physical of December 5, 2024 was reviewed with the patient and I examined the patient. There was no change. The surgical site was confirmed by the patient and me.     Plan: The risks, benefits, expected outcome, and alternative to the recommended procedure have been discussed with the patient. Patient understands and wants to proceed with the procedure.     Electronically signed by Chau Banuelos MD on 12/11/2024 at 7:22 AM

## 2024-12-11 NOTE — ANESTHESIA PROCEDURE NOTES
Peripheral Block    Patient location during procedure: pre-op  Reason for block: post-op pain management and at surgeon's request  Start time: 12/11/2024 7:24 AM  End time: 12/11/2024 7:28 AM  Staffing  Performed: anesthesiologist   Anesthesiologist: Carlos Bearden MD  Performed by: Carlos Bearden MD  Authorized by: Carlos Bearden MD    Preanesthetic Checklist  Completed: patient identified, IV checked, site marked, risks and benefits discussed, surgical/procedural consents, equipment checked, pre-op evaluation, timeout performed, anesthesia consent given, oxygen available and monitors applied/VS acknowledged  Peripheral Block   Patient position: supine  Prep: ChloraPrep  Provider prep: mask and sterile gloves  Patient monitoring: continuous pulse ox, cardiac monitor, IV access, oxygen, responsive to questions and frequent blood pressure checks  Block type: Saphenous (adductor canal)  Laterality: right  Injection technique: single-shot  Guidance: ultrasound guided    Needle   Needle type: insulated echogenic nerve stimulator needle   Needle gauge: 20 G  Needle localization: ultrasound guidance  Test dose: negative  Needle length: 8 cm  Assessment   Injection assessment: negative aspiration for heme, local visualized surrounding nerve on ultrasound, no intravascular symptoms and no paresthesia on injection  Paresthesia pain: none  Slow fractionated injection: yes  Hemodynamics: stable  Outcomes: uncomplicated    Medications Administered  midazolam (VERSED) injection 2 mg/2mL - IntraVENous   2 mg - 12/11/2024 7:24:00 AM  dexmedeTOMIDine (PRECEDEX) injection 200 mcg/2 mL - Perineural   0.2 mL - 12/11/2024 7:28:00 AM  ropivacaine (NAROPIN) injection 0.5% - Perineural   20 mL - 12/11/2024 7:28:00 AM

## 2024-12-11 NOTE — PROGRESS NOTES
TRANSFER - IN REPORT:    Verbal report received from Sera nielsen Paige Barahona  being received from PACU for routine post-op      Report consisted of patient's Situation, Background, Assessment and   Recommendations(SBAR).     Information from the following report(s) Nurse Handoff Report, Adult Overview, Surgery Report, Intake/Output, MAR, and Recent Results was reviewed with the receiving nurse.    Opportunity for questions and clarification was provided.      Assessment completed upon patient's arrival to unit and care assumed.

## 2024-12-12 VITALS
OXYGEN SATURATION: 100 % | HEIGHT: 65 IN | WEIGHT: 195.2 LBS | TEMPERATURE: 98.8 F | DIASTOLIC BLOOD PRESSURE: 59 MMHG | HEART RATE: 76 BPM | BODY MASS INDEX: 32.52 KG/M2 | SYSTOLIC BLOOD PRESSURE: 114 MMHG | RESPIRATION RATE: 18 BRPM

## 2024-12-12 PROBLEM — M17.11 UNILATERAL PRIMARY OSTEOARTHRITIS, RIGHT KNEE: Status: RESOLVED | Noted: 2024-12-11 | Resolved: 2024-12-12

## 2024-12-12 LAB
ANION GAP SERPL CALC-SCNC: 6 MMOL/L (ref 3–18)
BUN SERPL-MCNC: 10 MG/DL (ref 7–18)
BUN/CREAT SERPL: 15 (ref 12–20)
CALCIUM SERPL-MCNC: 8.7 MG/DL (ref 8.5–10.1)
CHLORIDE SERPL-SCNC: 108 MMOL/L (ref 100–111)
CO2 SERPL-SCNC: 27 MMOL/L (ref 21–32)
CREAT SERPL-MCNC: 0.68 MG/DL (ref 0.6–1.3)
GLUCOSE SERPL-MCNC: 96 MG/DL (ref 74–99)
HCT VFR BLD AUTO: 29.4 % (ref 35–45)
HGB BLD-MCNC: 9.6 G/DL (ref 12–16)
POTASSIUM SERPL-SCNC: 4.1 MMOL/L (ref 3.5–5.5)
SODIUM SERPL-SCNC: 141 MMOL/L (ref 136–145)

## 2024-12-12 PROCEDURE — 97116 GAIT TRAINING THERAPY: CPT

## 2024-12-12 PROCEDURE — 6360000002 HC RX W HCPCS: Performed by: ORTHOPAEDIC SURGERY

## 2024-12-12 PROCEDURE — 80048 BASIC METABOLIC PNL TOTAL CA: CPT

## 2024-12-12 PROCEDURE — 85014 HEMATOCRIT: CPT

## 2024-12-12 PROCEDURE — 97535 SELF CARE MNGMENT TRAINING: CPT

## 2024-12-12 PROCEDURE — 36415 COLL VENOUS BLD VENIPUNCTURE: CPT

## 2024-12-12 PROCEDURE — 6370000000 HC RX 637 (ALT 250 FOR IP): Performed by: ORTHOPAEDIC SURGERY

## 2024-12-12 PROCEDURE — 97165 OT EVAL LOW COMPLEX 30 MIN: CPT

## 2024-12-12 PROCEDURE — 85018 HEMOGLOBIN: CPT

## 2024-12-12 PROCEDURE — 2580000003 HC RX 258: Performed by: ORTHOPAEDIC SURGERY

## 2024-12-12 RX ORDER — DOCUSATE SODIUM 100 MG/1
100 CAPSULE, LIQUID FILLED ORAL 2 TIMES DAILY PRN
Qty: 28 CAPSULE | Refills: 0
Start: 2024-12-12 | End: 2024-12-26

## 2024-12-12 RX ORDER — OXYCODONE AND ACETAMINOPHEN 5; 325 MG/1; MG/1
1 TABLET ORAL EVERY 4 HOURS PRN
Qty: 50 TABLET | Refills: 0
Start: 2024-12-12 | End: 2024-12-26

## 2024-12-12 RX ORDER — ASPIRIN 325 MG
325 TABLET, DELAYED RELEASE (ENTERIC COATED) ORAL DAILY
Qty: 30 TABLET | Refills: 0
Start: 2024-12-12 | End: 2025-01-11

## 2024-12-12 RX ADMIN — MONTELUKAST 10 MG: 10 TABLET, FILM COATED ORAL at 10:11

## 2024-12-12 RX ADMIN — OXYCODONE 10 MG: 5 TABLET ORAL at 01:24

## 2024-12-12 RX ADMIN — WATER 1000 MG: 1 INJECTION INTRAMUSCULAR; INTRAVENOUS; SUBCUTANEOUS at 10:19

## 2024-12-12 RX ADMIN — ACETAMINOPHEN 650 MG: 325 TABLET ORAL at 04:50

## 2024-12-12 RX ADMIN — VENLAFAXINE HYDROCHLORIDE 75 MG: 75 CAPSULE, EXTENDED RELEASE ORAL at 10:11

## 2024-12-12 RX ADMIN — CETIRIZINE HYDROCHLORIDE 10 MG: 10 TABLET, FILM COATED ORAL at 10:11

## 2024-12-12 RX ADMIN — ASPIRIN 325 MG: 325 TABLET, COATED ORAL at 10:12

## 2024-12-12 RX ADMIN — POLYETHYLENE GLYCOL 3350 17 G: 17 POWDER, FOR SOLUTION ORAL at 10:11

## 2024-12-12 RX ADMIN — OXYCODONE 10 MG: 5 TABLET ORAL at 10:10

## 2024-12-12 ASSESSMENT — PAIN DESCRIPTION - FREQUENCY
FREQUENCY: CONTINUOUS
FREQUENCY: CONTINUOUS

## 2024-12-12 ASSESSMENT — PAIN - FUNCTIONAL ASSESSMENT
PAIN_FUNCTIONAL_ASSESSMENT: PREVENTS OR INTERFERES SOME ACTIVE ACTIVITIES AND ADLS
PAIN_FUNCTIONAL_ASSESSMENT: PREVENTS OR INTERFERES SOME ACTIVE ACTIVITIES AND ADLS
PAIN_FUNCTIONAL_ASSESSMENT: ACTIVITIES ARE NOT PREVENTED

## 2024-12-12 ASSESSMENT — PAIN DESCRIPTION - ONSET
ONSET: ON-GOING
ONSET: ON-GOING

## 2024-12-12 ASSESSMENT — PAIN DESCRIPTION - PAIN TYPE
TYPE: SURGICAL PAIN
TYPE: SURGICAL PAIN

## 2024-12-12 ASSESSMENT — PAIN DESCRIPTION - LOCATION
LOCATION: KNEE

## 2024-12-12 ASSESSMENT — PAIN DESCRIPTION - ORIENTATION
ORIENTATION: RIGHT

## 2024-12-12 ASSESSMENT — PAIN SCALES - GENERAL
PAINLEVEL_OUTOF10: 7
PAINLEVEL_OUTOF10: 7
PAINLEVEL_OUTOF10: 5
PAINLEVEL_OUTOF10: 5
PAINLEVEL_OUTOF10: 8

## 2024-12-12 ASSESSMENT — PAIN DESCRIPTION - DESCRIPTORS
DESCRIPTORS: ACHING

## 2024-12-12 NOTE — PROGRESS NOTES
AVS reviewed with pt, all questions answered. Pt educated on medication uses and side effects, how/when to take meds, follow up appointments, and physician discharge instructions. Pt verbalized understanding. IV removed, no complications. Pt being discharged home via family transport.

## 2024-12-12 NOTE — PROGRESS NOTES
Physical Therapy Goals:  Initiated 12/11/2024 to be met within 7-10 days.  Short Term Goals  Short Term Goal 1: Patient will perform supine to/from sit with SBA  Short Term Goal 2: Patient will perform sit to/from stand with SB-CGA in preperation for gait progression  Short Term Goal 3: Patient will ambulate 100 ft with RW and SB-CGA to progress OOB mobility  Short Term Goal 4: Patient will negotiate 1 stairs with handrails and CGA to simulate home entry    []  Patient has met MD kayleen coombs for d/c home   []  Recommend HH with 24 hour adult care   [x]  Benefit from additional acute PT session(s)    PHYSICAL THERAPY EVALUATION    Patient: Paige Barahona (59 y.o. female)  Date: 12/11/2024  Primary Diagnosis: Osteoarthritis of right knee, unspecified osteoarthritis type [M17.11]  Unilateral primary osteoarthritis, right knee [M17.11]  Procedure(s) (LRB):  RIGHT TOTAL KNEE ARTHROPLASTY WITH C-ARM MAKOPLASTY (Right) 1 Day Post-Op   Precautions: Fall Risk, Weight Bearing, Right Lower Extremity Weight Bearing: Weight Bearing As Tolerated  PLOF: Independent ambulation without AD    ASSESSMENT :  Based on the objective data described below, the patient presents with lower extremity weakness, decreased gait quality and endurance, impaired bed mobility and transfers, decreased AROM/flexibility, and overall limitations in functional mobility s/p surgery noted above. Pt performed sit to stand with contact guard assist. Patient ambulated 30 feet with RW, GB applied, contact guard assist. Pt educated on icing, elevation, positioning, home safety, home exercise program, and activity recommendations.     DEFICITS/IMPAIRMENTS:    , Body Structures, Functions, Activity Limitations Requiring Skilled Therapeutic Intervention: Decreased functional mobility ;Decreased ROM;Decreased strength;Decreased sensation;Decreased balance;Increased pain    Patient will benefit from skilled intervention to address the above

## 2024-12-12 NOTE — PROGRESS NOTES
Bedside and Verbal shift change report given to Geno (oncoming nurse) by Brittney (offgoing nurse). Report included the following information Nurse Handoff Report, Adult Overview, Surgery Report, Intake/Output, MAR, and Recent Results.

## 2024-12-12 NOTE — DISCHARGE SUMMARY
COLACE  Take 1 capsule by mouth 2 times daily as needed for Constipation     oxyCODONE-acetaminophen 5-325 MG per tablet  Commonly known as: Percocet  Take 1 tablet by mouth every 4 hours as needed for Pain for up to 14 days. Intended supply: 5 days. Take lowest dose possible to manage pain Max Daily Amount: 6 tablets            CONTINUE taking these medications      acetaminophen 500 MG tablet  Commonly known as: TYLENOL     Allegra Allergy 60 MG tablet  Generic drug: fexofenadine     fluticasone 50 MCG/ACT nasal spray  Commonly known as: FLONASE     polyethylene glycol 17 GM/SCOOP powder  Commonly known as: GLYCOLAX     Singulair 10 MG tablet  Generic drug: montelukast     venlafaxine 75 MG extended release capsule  Commonly known as: EFFEXOR XR               Where to Get Your Medications        Information about where to get these medications is not yet available    Ask your nurse or doctor about these medications  aspirin 325 MG EC tablet  docusate sodium 100 MG capsule  oxyCODONE-acetaminophen 5-325 MG per tablet         * Follow-up Care/Patient Instructions:  Activity: Activity as tolerated and no driving for today, No lifting, Driving, or Strenuous exercise for 6 weeks, and PT/OT Eval and Treat  Diet: Regular Diet  Wound Care: Reinforce dressing PRN and As directed      Signed:  Chau Banuelos MD  12/12/2024  7:38 AM

## 2024-12-12 NOTE — PROGRESS NOTES
Occupational Therapy Goals:  Initiated 12/12/2024 to be met within 7-10 days.  Short Term Goals  Time Frame for Short Term Goals: 7 days  Short Term Goal 1: The patient will demonstrate ability to complete LB dressing tasks at supervision level with use of AE as needed.  Short Term Goal 2: The patient will demonstrate ability to complete LB bathing tasks at supervision level with use of AE as needed.  Short Term Goal 3: The patient will demonstrate ability to complete toilet transfers at supervision level.  Short Term Goal 4: The patient will demonstrate ability to complete toileting tasks at supervision level.  Short Term Goal 5: The patient will demonstrate ability to complete grooming tasks standing at sink at supervision level.    OCCUPATIONAL THERAPY EVALUATION    Patient: Paige Barahona (59 y.o. female)  Date: 12/12/2024  Primary Diagnosis: Osteoarthritis of right knee, unspecified osteoarthritis type [M17.11]  Unilateral primary osteoarthritis, right knee [M17.11]  Procedure(s) (LRB):  RIGHT TOTAL KNEE ARTHROPLASTY WITH C-ARM MAKOPLASTY (Right) 1 Day Post-Op   Precautions: Fall Risk, Weight Bearing, Surgical Protocols, Right Lower Extremity Weight Bearing: Weight Bearing As Tolerated,  PLOF: Pt was independent in ADLs and ambulated independently without AD.     ASSESSMENT : Pt cleared for therapy evaluation by RN. Upon therapist's arrival, pt was semi-reclined in bed with bilateral gripper socks donned and pt reporting 7/10 pain in R knee. Pt was agreeable to occupational therapy evaluation. OT educated pt regarding the role of occupational therapy. Pt verbalized 100% understanding. OT educated pt regarding knee precautions and weight bearing status s/p R TKA. Pt verbalized 100% understanding. Pt demonstrated ability to complete supine to sit with SBA exiting bed to right side. Pt requesting to use restroom at this time. Pt ambulated to bathroom with CGA and use of 2WW (vc's for safe use of walker). Pt

## 2024-12-12 NOTE — PLAN OF CARE
Problem: Pain  Goal: Verbalizes/displays adequate comfort level or baseline comfort level  12/12/2024 0749 by Geno Mendez RN  Outcome: Progressing     Problem: Safety - Adult  Goal: Free from fall injury  12/12/2024 0749 by Geno Mendez RN  Outcome: Progressing     Problem: Discharge Planning  Goal: Discharge to home or other facility with appropriate resources  12/12/2024 0749 by Geno Mendez RN  Outcome: Progressing     Problem: Skin/Tissue Integrity  Goal: Absence of new skin breakdown  Description: 1.  Monitor for areas of redness and/or skin breakdown  2.  Assess vascular access sites hourly  3.  Every 4-6 hours minimum:  Change oxygen saturation probe site  4.  Every 4-6 hours:  If on nasal continuous positive airway pressure, respiratory therapy assess nares and determine need for appliance change or resting period.  12/12/2024 0749 by Geno Mendez RN  Outcome: Progressing     Problem: ABCDS Injury Assessment  Goal: Absence of physical injury  12/12/2024 0749 by Geno Mendez RN  Outcome: Progressing

## 2024-12-12 NOTE — PROGRESS NOTES
2330 - Spoke with Dr. Banuelos at this time regarding pt not having postop abx orders. Telephone orders received for Ancef IV 1g q8h for 3 doses.

## 2024-12-12 NOTE — PROGRESS NOTES
1917- Bedside and Verbal shift change report given to SATINDER Lora (oncoming nurse) by SATINDER Lugo (offgoing nurse). Report included the following information Nurse Handoff Report, Adult Overview, Surgery Report, Intake/Output, MAR, and Recent Results.      1930- Patient A&Ox4, RA. Denies chest pain and SOB. With ace wrap dressing to R knee C/D/I.  Denies numbness/tingling/calf pain on LLE, numbness on R knee.  Denies any pain. With compression device bilaterally. Pt educated on unit routine, IS use, q2h rounds, and pain management. Pt verbalized understanding, no concerns voiced. Call bell and telephone within reach, bed in lowest position. Pt encouraged to call for assistance via call bell/zone phone.     0748- Bedside and Verbal shift change report given to SATINDER Clark (oncoming nurse) by SATINDER Lora (offgoing nurse). Report included the following information Nurse Handoff Report, Adult Overview, Surgery Report, Intake/Output, MAR, and Recent Results.

## 2024-12-13 ENCOUNTER — HOSPITAL ENCOUNTER (OUTPATIENT)
Facility: HOSPITAL | Age: 59
Setting detail: RECURRING SERIES
Discharge: HOME | End: 2024-12-16
Payer: OTHER GOVERNMENT

## 2024-12-13 PROCEDURE — 97530 THERAPEUTIC ACTIVITIES: CPT

## 2024-12-13 PROCEDURE — 97535 SELF CARE MNGMENT TRAINING: CPT

## 2024-12-13 PROCEDURE — 97110 THERAPEUTIC EXERCISES: CPT

## 2024-12-13 PROCEDURE — 97112 NEUROMUSCULAR REEDUCATION: CPT

## 2024-12-13 NOTE — PROGRESS NOTES
PHYSICAL / OCCUPATIONAL THERAPY - DAILY TREATMENT NOTE     Patient Name: Paige Barahona    Date: 2024    : 1965  Insurance: Payor:  EAST / Plan:  EAST PRIME / Product Type: *No Product type* /      Patient  verified Yes     Visit #   Current / Total 2 35   Time   In / Out 10:43 11:27   Pain   In / Out 8 8   Subjective Functional Status/Changes: Patient returns s/p r TKA 2024, discharged yesterday 2024 around noon. Patient reports performing ankle pumps and heel raises, icing and elevating as instructed at discharge   Changes to:  Allergies, Med Hx, Sx Hx?   yes  Oxycodone 10 mg, aspirin, Dulcolax     TREATMENT AREA =  Pain in right knee [M25.561]    If an interpreting service is utilized for treatment of this patient, the contents of this document represent the material reviewed with the patient via the .     OBJECTIVE      Therapeutic Procedures:  Tx Min Billable or 1:1 Min (if diff from Tx Min) Procedure, Rationale, Specifics   13  49212 Therapeutic Exercise (timed):  increase ROM, strength, coordination, balance, and proprioception to improve patient's ability to progress to PLOF and address remaining functional goals. (see flow sheet as applicable)    Details if applicable:       11  00124 Therapeutic Activity (timed):  use of dynamic activities replicating functional movements to increase ROM, strength, coordination, balance, and proprioception in order to improve patient's ability to progress to PLOF and address remaining functional goals.  (see flow sheet as applicable)    Details if applicable:     10  62854 Neuromuscular Re-Education (timed):  improve balance, coordination, kinesthetic sense, posture, core stability and proprioception to improve patient's ability to develop conscious control of individual muscles and awareness of position of extremities in order to progress to PLOF and address remaining functional goals. (see flow sheet as applicable)

## 2024-12-13 NOTE — PROGRESS NOTES
[facility]  [address] [place] [address]  Ph [phone]  Fx [phone]      Continued Plan of Care/ Re-certification for Physical Therapy Services    Patient name: Paige Barahona Start of Care: 2024   Referral source: Chau Banuelos MD : 1965               Medical Diagnosis: Pain in right knee [M25.561]    Onset Date: 2021               Treatment Diagnosis: M25.561  RIGHT KNEE PAIN      Prior Hospitalization: see medical history Provider#: [id number]      Comorbidities: Other: Left knee replacement 2024, hysterectomy ~ 10 years ago, tummy tuck and breast aug 8 years ago, hiatal hernia repair  and , right knee meniscus repair , OA     Prior Level of Function:   [x] Unrestricted with functional activities and ADL's  [x] No assistive device  [x] active lifestyle, [] moderately active lifestyle, [] sedentary lifestyle    Visits from Start of Care: 2    Missed Visits: 0    Reporting Period: 2024 to 2024    The Plan of Care and following information is based on the patient's current status:    Key functional changes: right TKA 2024      Problems/ barriers to goal attainment: right TKA 2024     Problem List: pain affecting function, decrease ROM, decrease strength, edema affection function, impaired gait/balance, decrease ADL/functional abilities, decrease activity tolerance, decrease flexibility/joint mobility, and decrease transfer abilities     Treatment Plan: Therapeutic exercise, Neuromuscular reeducation, Manual therapy, Therapeutic activity, Self care/home management, Vasopneumatic device, and Gait training     Goals for this certification period     Short Term Goals:    to be accomplished within 18 treatments:     Patient will be compliant and independent with prescribed HEP in order to assist in maximizing therapeutic gains and improving overall function.  Pre-Op Eval: HEP issued and reviewed with patient today,

## 2024-12-14 NOTE — PROGRESS NOTES
Physical Therapy Goals:  Initiated 12/12/2024 to be met within 7-10 days.  Short Term Goals  Short Term Goal 1: Patient will perform supine to/from sit with SBA  Short Term Goal 2: Patient will perform sit to/from stand with SB-CGA in preperation for gait progression  Short Term Goal 3: Patient will ambulate 100 ft with RW and SB-CGA to progress OOB mobility  Short Term Goal 4: Patient will negotiate 1 stairs with handrails and CGA to simulate home entry    [x]  Patient has met MD kayleen coombs for d/c home   [x]  Recommend 24 hour adult care   []  Benefit from additional acute PT session to address:        PHYSICAL THERAPY TREATMENT    Patient: Paige Barahona (59 y.o. female)  Date: 12/12/2024  Diagnosis: Osteoarthritis of right knee, unspecified osteoarthritis type [M17.11]  Unilateral primary osteoarthritis, right knee [M17.11] Unilateral primary osteoarthritis, right knee  Procedure(s) (LRB):  RIGHT TOTAL KNEE ARTHROPLASTY WITH C-ARM MAKOPLASTY (Right) 2 Days Post-Op  Precautions: Fall Risk, Weight Bearing, Surgical Protocols, Right Lower Extremity Weight Bearing: Weight Bearing As Tolerated    ASSESSMENT:  Pt performed supine to sit with SBA, sit to stand with CGA. Patient ambulated 100 feet with RW, GB applied, CGA. No foot drop noted today. Pt negotiated 5 stairs with handrails and CGA. Pt educated on icing, elevation, positioning, home safety, home exercise program, and activity recommendations.     Progression toward goals:   [x]      Improving appropriately and progressing toward goals  []      Improving slowly and progressing toward goals  []      Not making progress toward goals and plan of care will be adjusted     PLAN:  Patient continues to benefit from skilled intervention to address the above impairments.  Continue treatment per established plan of care.    Further Equipment Recommendations for Discharge: N/A    AMPA: 18/24    This AMPA score should be considered in conjunction with

## 2024-12-16 ENCOUNTER — HOSPITAL ENCOUNTER (OUTPATIENT)
Facility: HOSPITAL | Age: 59
Setting detail: RECURRING SERIES
Discharge: HOME | End: 2024-12-19
Payer: OTHER GOVERNMENT

## 2024-12-16 PROCEDURE — 97016 VASOPNEUMATIC DEVICE THERAPY: CPT

## 2024-12-16 PROCEDURE — 97530 THERAPEUTIC ACTIVITIES: CPT

## 2024-12-16 PROCEDURE — 97112 NEUROMUSCULAR REEDUCATION: CPT

## 2024-12-16 PROCEDURE — 97110 THERAPEUTIC EXERCISES: CPT

## 2024-12-16 NOTE — PROGRESS NOTES
Reassess: deferred, will test when appropriate     Patient will be able to perform at least 15 reps of sit <> stands with good form/control during 30\" STS test to demonstrate improved LE strength and stability, thus reducing the patients risk of falls.  Pre-Op Eval: NT  Post-Op Reassess: will test when appropriate     PLAN  Yes  Continue plan of care  []  Upgrade activities as tolerated  []  Discharge due to :  []  Other:    Destiney Estrada PT    12/16/2024    9:42 AM    Future Appointments   Date Time Provider Department Center   12/16/2024  3:10 PM Destiney Estrada, PT [facility]   12/17/2024  3:10 PM Carolyn Mancera, PT [facility]   12/18/2024  1:10 PM Destiney Estrada, PT [facility]   12/19/2024  3:50 PM Bryn Escudero Jr., PTA [facility]   12/20/2024  2:30 PM Galo, Juan David H, PTA [facility]   12/30/2024  1:10 PM Destiney Estrada, PT [facility]   1/2/2025  3:50 PM Bryn Escudero Jr., PTA [facility]   1/3/2025  2:30 PM Galo, Juan David H, PTA [facility]   1/6/2025  4:30 PM Galo, Juan David H, PTA [facility]   1/8/2025  3:10 PM Destiney Estrada, PT [facility]   1/10/2025  3:10 PM Carolyn Mancera K, PT [facility]   1/13/2025  3:50 PM Carolyn Mancera K, PT [facility]   1/15/2025  4:30 PM Galo, Juan David H, PTA [facility]   1/17/2025  3:10 PM Carolyn Mancera K, PT [facility]   1/20/2025  3:50 PM Carolyn Mancera K, PT [facility]   1/22/2025  5:10 PM Galo, Juan David H, PTA [facility]   1/24/2025  3:10 PM Carolyn Mancera, PT [facility]   1/27/2025  5:10 PM Galo, Juan David H, PTA [facility]   1/29/2025  5:10 PM Galo, Juan David H, PTA [facility]   1/31/2025  3:10 PM Carolyn Mancera, PT [facility]   2/3/2025  5:10 PM Galo, Juan David H, PTA [facility]   2/5/2025  5:10 PM Galo, Juan David H, PTA [facility]   2/7/2025  3:10 PM Carolyn Mancera, PT [facility]   2/10/2025  5:10 PM Galo, Juan David H, PTA [facility]   2/12/2025  5:10 PM Galo, Juan David H, PTA [facility]   2/14/2025  3:10 PM Fiordaliza,

## 2024-12-17 ENCOUNTER — HOSPITAL ENCOUNTER (OUTPATIENT)
Facility: HOSPITAL | Age: 59
Setting detail: RECURRING SERIES
Discharge: HOME | End: 2024-12-20
Payer: OTHER GOVERNMENT

## 2024-12-17 PROCEDURE — 97110 THERAPEUTIC EXERCISES: CPT

## 2024-12-17 PROCEDURE — 97016 VASOPNEUMATIC DEVICE THERAPY: CPT

## 2024-12-17 PROCEDURE — 97530 THERAPEUTIC ACTIVITIES: CPT

## 2024-12-17 NOTE — PROGRESS NOTES
stability, thus reducing the patients risk of falls.  Pre-Op Eval: NT  Post-Op Reassess: will test when appropriate     PLAN  Yes  Continue plan of care  []  Upgrade activities as tolerated  []  Discharge due to :  []  Other:    Carolyn Mancera, PT    12/17/2024    9:19 AM    Future Appointments   Date Time Provider Department Center   12/17/2024  3:10 PM Carolyn Mancera, PT [facility]   12/18/2024  1:10 PM Destiney Estrada, PT [facility]   12/19/2024  3:50 PM Bryn Escudero Jr., PTA [facility]   12/20/2024  2:30 PM Galo, Juan David H, PTA [facility]   12/30/2024  1:10 PM Destiney Estrada, PT [facility]   1/2/2025  3:50 PM Bryn Escudero Jr., PTA [facility]   1/3/2025  2:30 PM Galo, Juan David H, PTA [facility]   1/6/2025  4:30 PM Galo, Juan David H, PTA [facility]   1/8/2025  3:10 PM Destiney Estrada, PT [facility]   1/10/2025  3:10 PM Carolyn Mancera, PT [facility]   1/13/2025  3:50 PM Carolyn Mancera, PT [facility]   1/15/2025  4:30 PM Galo, Juan David H, PTA [facility]   1/17/2025  3:10 PM Carolyn Mancera K, PT [facility]   1/20/2025  3:50 PM Carolyn Mancera K, PT [facility]   1/22/2025  5:10 PM Galo, Juan David H, PTA [facility]   1/24/2025  3:10 PM FiordalizaCarolyn park K, PT [facility]   1/27/2025  5:10 PM Galo, Juan David H, PTA [facility]   1/29/2025  5:10 PM Galo, Juan David H, PTA [facility]   1/31/2025  3:10 PM Carolyn Mancera, PT [facility]   2/3/2025  5:10 PM Galo, Juan David H, PTA [facility]   2/5/2025  5:10 PM Galo, Juan David H, PTA [facility]   2/7/2025  3:10 PM Carolyn Mancera, PT [facility]   2/10/2025  5:10 PM Galo, Juan David H, PTA [facility]   2/12/2025  5:10 PM Galo, Juan David H, PTA [facility]   2/14/2025  3:10 PM Carolyn Mancera, PT [facility]   2/17/2025  5:10 PM Galo, Juan David H, PTA [facility]   2/19/2025  5:10 PM Galo, Juan David H, PTA [facility]   2/21/2025  3:10 PM Carolyn Mancera, PT [facility]

## 2024-12-18 ENCOUNTER — HOSPITAL ENCOUNTER (OUTPATIENT)
Facility: HOSPITAL | Age: 59
Setting detail: RECURRING SERIES
Discharge: HOME | End: 2024-12-21
Payer: OTHER GOVERNMENT

## 2024-12-18 PROCEDURE — 97530 THERAPEUTIC ACTIVITIES: CPT

## 2024-12-18 PROCEDURE — 97110 THERAPEUTIC EXERCISES: CPT

## 2024-12-18 PROCEDURE — 97112 NEUROMUSCULAR REEDUCATION: CPT

## 2024-12-18 PROCEDURE — 97016 VASOPNEUMATIC DEVICE THERAPY: CPT

## 2024-12-18 NOTE — PROGRESS NOTES
PHYSICAL / OCCUPATIONAL THERAPY - DAILY TREATMENT NOTE     Patient Name: Paige Barahona    Date: 2024    : 1965  Insurance: Payor:  EAST / Plan:  EAST PRIME / Product Type: *No Product type* /      Patient  verified Yes     Visit #   Current / Total 5 35   Time   In / Out 112 230   Pain   In / Out 5 4   Subjective Functional Status/Changes: Pt reported that she is trying    Changes to:  Allergies, Med Hx, Sx Hx?   no       TREATMENT AREA =  Pain in right knee [M25.561]    If an interpreting service is utilized for treatment of this patient, the contents of this document represent the material reviewed with the patient via the .     OBJECTIVE  Modalities Rationale:     to decrease pain, decrease edema, decrease inflammation     10 min [x]  Vasopneumatic Device, press/temp: Moderate/34 degs   If using vaso (only need to measure limb vaso being performed on)      pre-treatment girth : 45      post-treatment girth : 44      measured at (landmark location) :  mid-patella     min []  Other:     Skin assessment post-treatment (if applicable):    []  intact    []  redness- no adverse reaction                 []redness - adverse reaction:        Therapeutic Procedures:  Tx Min Billable or 1:1 Min (if diff from Tx Min) Procedure, Rationale, Specifics   30  77558 Therapeutic Exercise (timed):  increase ROM, strength, coordination, balance, and proprioception to improve patient's ability to progress to PLOF and address remaining functional goals. (see flow sheet as applicable)    Details if applicable:       28  72620 Neuromuscular Re-Education (timed):  improve balance, coordination, kinesthetic sense, posture, core stability and proprioception to improve patient's ability to develop conscious control of individual muscles and awareness of position of extremities in order to progress to PLOF and address remaining functional goals. (see flow sheet as applicable)    Details if

## 2024-12-19 ENCOUNTER — HOSPITAL ENCOUNTER (OUTPATIENT)
Facility: HOSPITAL | Age: 59
Setting detail: RECURRING SERIES
Discharge: HOME | End: 2024-12-22
Payer: OTHER GOVERNMENT

## 2024-12-19 PROCEDURE — 97110 THERAPEUTIC EXERCISES: CPT

## 2024-12-19 PROCEDURE — 97530 THERAPEUTIC ACTIVITIES: CPT

## 2024-12-19 PROCEDURE — 97016 VASOPNEUMATIC DEVICE THERAPY: CPT

## 2024-12-19 NOTE — PROGRESS NOTES
PHYSICAL / OCCUPATIONAL THERAPY - DAILY TREATMENT NOTE     Patient Name: Paige Barahona    Date: 2024    : 1965  Insurance: Payor:  EAST / Plan: [employer] EAST PRIME / Product Type: *No Product type* /      Patient  verified Yes     Visit #   Current / Total 6 35   Time   In / Out 3:12 4:32   Pain   In / Out 8/10 6/10   Subjective Functional Status/Changes: \"I'm feeling it today, little bit more painful today for some reason.\"   Changes to:  Allergies, Med Hx, Sx Hx?   no       TREATMENT AREA =  Pain in right knee [M25.561]    If an interpreting service is utilized for treatment of this patient, the contents of this document represent the material reviewed with the patient via the .     OBJECTIVE    Modalities Rationale:     to decrease pain, decrease edema, decrease inflammation     min [] Estim Unattended, type/location:                                      []  w/ice    []  w/heat    min [] Estim Attended, type/location:                                     []  w/US     []  w/ice    []  w/heat    []  TENS insruct      min []  Mechanical Traction: type/lbs                   []  pro   []  sup   []  int   []  cont    []  before manual    []  after manual    min []  Ultrasound, settings/location:      min []  Iontophoresis w/ dexamethasone, location:                                               []  take home patch       []  in clinic        min  unbilled []  Ice     []  Heat    location/position:     min []  Paraffin,  details:    10 min [x]  Vasopneumatic Device, press/temp: Moderate/34 degs    min []  Whirlpool / Fluido:    If using vaso (only need to measure limb vaso being performed on)      pre-treatment girth : 45.6      post-treatment girth : 44.3      measured at (landmark location) :  mid-patella    min []  Other:    Skin assessment post-treatment (if applicable):    []  intact    []  redness- no adverse reaction                 []redness - adverse reaction:

## 2024-12-20 ENCOUNTER — TELEPHONE (OUTPATIENT)
Facility: HOSPITAL | Age: 59
End: 2024-12-20

## 2024-12-20 ENCOUNTER — HOSPITAL ENCOUNTER (OUTPATIENT)
Facility: HOSPITAL | Age: 59
Setting detail: RECURRING SERIES
End: 2024-12-20
Payer: OTHER GOVERNMENT

## 2024-12-20 NOTE — PROGRESS NOTES
PHYSICAL / OCCUPATIONAL THERAPY - DAILY TREATMENT NOTE     Patient Name: Paige Barahona    Date: 2024    : 1965  Insurance: Payor: [employer] EAST / Plan: [employer] EAST PRIME / Product Type: *No Product type* /      Patient  verified Yes     Visit #   Current / Total 7 35   Time   In / Out *** ***   Pain   In / Out *** ***   Subjective Functional Status/Changes: ***   Changes to:  Allergies, Med Hx, Sx Hx?   no       TREATMENT AREA =  Pain in right knee [M25.561]    If an interpreting service is utilized for treatment of this patient, the contents of this document represent the material reviewed with the patient via the .     OBJECTIVE    Modalities Rationale:     decrease edema, decrease inflammation, and decrease pain to improve patient's ability to progress to PLOF and address remaining functional goals.     min [] Estim Unattended, type/location:                                      []  w/ice    []  w/heat    min [] Estim Attended, type/location:                                     []  w/US     []  w/ice    []  w/heat    []  TENS insruct      min []  Mechanical Traction: type/lbs                   []  pro   []  sup   []  int   []  cont    []  before manual    []  after manual    min []  Ultrasound, settings/location:      min []  Iontophoresis w/ dexamethasone, location:                                               []  take home patch       []  in clinic        min  unbilled []  Ice     []  Heat    location/position:     min []  Paraffin,  details:     min []  Vasopneumatic Device, press/temp:     min []  Whirlpool / Fluido:    If using vaso (only need to measure limb vaso being performed on)      pre-treatment girth :       post-treatment girth :       measured at (landmark location) :      min []  Other:    Skin assessment post-treatment (if applicable):    [x]  intact    []  redness- no adverse reaction                 []redness - adverse reaction:         Therapeutic Procedures:  Tx  

## 2024-12-23 ENCOUNTER — HOSPITAL ENCOUNTER (OUTPATIENT)
Facility: HOSPITAL | Age: 59
Setting detail: RECURRING SERIES
Discharge: HOME | End: 2024-12-26
Payer: OTHER GOVERNMENT

## 2024-12-23 PROCEDURE — 97110 THERAPEUTIC EXERCISES: CPT

## 2024-12-23 PROCEDURE — 97112 NEUROMUSCULAR REEDUCATION: CPT

## 2024-12-23 PROCEDURE — 97530 THERAPEUTIC ACTIVITIES: CPT

## 2024-12-23 PROCEDURE — 97016 VASOPNEUMATIC DEVICE THERAPY: CPT

## 2024-12-23 NOTE — PROGRESS NOTES
PHYSICAL / OCCUPATIONAL THERAPY - DAILY TREATMENT NOTE     Patient Name: Paige Barahona    Date: 2024    : 1965  Insurance: Payor: [employer] EAST / Plan: [employer] EAST PRIME / Product Type: *No Product type* /      Patient  verified Yes     Visit #   Current / Total 7 35   Time   In / Out 12:41 1:46   Pain   In / Out 6 6   Subjective Functional Status/Changes: Patient states her knee is stiff today.   Changes to:  Allergies, Med Hx, Sx Hx?   no       TREATMENT AREA =  Pain in right knee [M25.561]    If an interpreting service is utilized for treatment of this patient, the contents of this document represent the material reviewed with the patient via the .     OBJECTIVE    Modalities Rationale:     to decrease pain, decrease edema, decrease inflammation     min [] Estim Unattended, type/location:                                      []  w/ice    []  w/heat    min [] Estim Attended, type/location:                                     []  w/US     []  w/ice    []  w/heat    []  TENS insruct      min []  Mechanical Traction: type/lbs                   []  pro   []  sup   []  int   []  cont    []  before manual    []  after manual    min []  Ultrasound, settings/location:      min []  Iontophoresis w/ dexamethasone, location:                                               []  take home patch       []  in clinic        min  unbilled []  Ice     []  Heat    location/position:     min []  Paraffin,  details:    10 min [x]  Vasopneumatic Device, press/temp: Moderate/34 degs    min []  Whirlpool / Fluido:    If using vaso (only need to measure limb vaso being performed on)      pre-treatment girth : 47      post-treatment girth : 46      measured at (landmark location) :  mid-patella    min []  Other:    Skin assessment post-treatment (if applicable):    []  intact    []  redness- no adverse reaction                 []redness - adverse reaction:          Therapeutic Procedures:  Tx Min Billable or 1:1

## 2024-12-24 ENCOUNTER — APPOINTMENT (OUTPATIENT)
Facility: HOSPITAL | Age: 59
End: 2024-12-24
Payer: OTHER GOVERNMENT

## 2024-12-27 ENCOUNTER — TELEPHONE (OUTPATIENT)
Facility: HOSPITAL | Age: 59
End: 2024-12-27

## 2024-12-27 NOTE — TELEPHONE ENCOUNTER
Called pt to see if she wanted to move her Monday at 1:10 to a 1:50 appt. The pt said she is fine with switching her appt. to that time.

## 2024-12-30 ENCOUNTER — HOSPITAL ENCOUNTER (OUTPATIENT)
Facility: HOSPITAL | Age: 59
Setting detail: RECURRING SERIES
Discharge: HOME | End: 2025-01-02
Payer: OTHER GOVERNMENT

## 2024-12-30 PROCEDURE — 97112 NEUROMUSCULAR REEDUCATION: CPT

## 2024-12-30 PROCEDURE — 97110 THERAPEUTIC EXERCISES: CPT

## 2024-12-30 PROCEDURE — 97530 THERAPEUTIC ACTIVITIES: CPT

## 2024-12-30 PROCEDURE — 97016 VASOPNEUMATIC DEVICE THERAPY: CPT

## 2024-12-30 NOTE — PROGRESS NOTES
bilateral LE's in order to be able to safely perform functional activities and demonstrate improved stability and strength.  Pre-Op Eval: 4+/5 bilateral lower extremity strength  Post-Op Reassess: will test when appropriate     Patient will be able to safely ambulate community distances without AD and functional gait mechanics in order to improve overall mobility and return patient to his or her PLOF.  Pre-Op Eval: WNL community distances without AD   Post-Op Reassess: FWW, step to pattern, slow valentina     Patient will be able to safely negotiate a full flight of stairs with a step-through pattern while holding onto at least one handrail in order to return to normal with this functional activity and improve safety on stairs.  Pre-Op Eval: step to pattern  Post-Op Reassess: deferred, will test when appropriate     Patient will be able to perform at least 15 reps of sit <> stands with good form/control during 30\" STS test to demonstrate improved LE strength and stability, thus reducing the patients risk of falls.  Pre-Op Eval: NT  Post-Op Reassess: will test when appropriate    PLAN  Yes  Continue plan of care  []  Upgrade activities as tolerated  []  Discharge due to :  []  Other:    Destiney Estrada PT    12/30/2024    8:08 AM    Future Appointments   Date Time Provider Department Center   12/30/2024  1:50 PM Destiney Estrada, PT [facility]   1/2/2025  3:50 PM Bryn Escudero Jr., PTA [facility]   1/3/2025  2:30 PM [facility]   1/6/2025  4:30 PM Juan David [facility]enthal[facility]   1/8/2025  3:10 PM [facility]iney [facility]strada[facility]   1/10/2025  3:10 PM Carolyn Mancera, PT [facility]   1/13/2025  3:50 PM Carolyn Mancera, PT [facility]   1/15/2025  4:30 PM Juan David Rosenthal, PTA [facility]   1/17/2025  3:10 PM [facility]a, [facility]   1/20/2025  3:50 PM Carolyn Mancera, PT [facility]   1/22/2025  5:10 PM Juan David Rosenthal, PTA [facility]   1/24/2025  3:10 PM Fiordaliza,

## 2025-01-02 ENCOUNTER — HOSPITAL ENCOUNTER (OUTPATIENT)
Facility: HOSPITAL | Age: 60
Setting detail: RECURRING SERIES
Discharge: HOME | End: 2025-01-05
Payer: OTHER GOVERNMENT

## 2025-01-02 PROCEDURE — 97110 THERAPEUTIC EXERCISES: CPT

## 2025-01-02 PROCEDURE — 97530 THERAPEUTIC ACTIVITIES: CPT

## 2025-01-02 PROCEDURE — 97112 NEUROMUSCULAR REEDUCATION: CPT

## 2025-01-02 PROCEDURE — 97535 SELF CARE MNGMENT TRAINING: CPT

## 2025-01-02 PROCEDURE — 97016 VASOPNEUMATIC DEVICE THERAPY: CPT

## 2025-01-02 NOTE — PROGRESS NOTES
[facility]   1/6/2025  4:30 PM Galo, Juan David H, PTA [facility]   1/8/2025  3:10 PM SlackDestiney, PT [facility]   1/10/2025  3:10 PM Fiordaliza, Carolyn K, PT [facility]   1/13/2025  3:50 PM Fiordaliza, Carolyn K, PT [facility]   1/15/2025  4:30 PM Galo, Juan David H, PTA [facility]   1/17/2025  3:10 PM Fiordaliza, Carolyn K, PT [facility]   1/20/2025  3:50 PM Fiordaliza, Carolyn K, PT [facility]   1/22/2025  5:10 PM Galo, Juan David H, PTA [facility]   1/24/2025  3:10 PM Fiordaliza, Carolyn K, PT [facility]   1/27/2025  5:10 PM Galo, Juan David H, PTA [facility]   1/29/2025  5:10 PM Galo, Juan David H, PTA [facility]   1/31/2025  3:10 PM Fiordaliza, Carolyn K, PT [facility]   2/3/2025  5:10 PM Galo, Juan David H, PTA [facility]   2/5/2025  5:10 PM Galo, Juan David H, PTA [facility]   2/7/2025  3:10 PM Fiordaliza, Carolyn K, PT [facility]   2/10/2025  5:10 PM Galo, Juan David H, PTA [facility]   2/12/2025  5:10 PM Theo, Jovita, PT [facility]   2/14/2025  3:10 PM Fiordaliza, Carolyn K, PT [facility]   2/17/2025  5:10 PM Galo, Juan David H, PTA [facility]   2/19/2025  5:10 PM Theo, Jovita, PT [facility]   2/21/2025  3:10 PM Fiordaliza, Carolyn K, PT [facility]

## 2025-01-03 ENCOUNTER — HOSPITAL ENCOUNTER (OUTPATIENT)
Facility: HOSPITAL | Age: 60
Setting detail: RECURRING SERIES
Discharge: HOME | End: 2025-01-06
Payer: OTHER GOVERNMENT

## 2025-01-03 PROCEDURE — 97112 NEUROMUSCULAR REEDUCATION: CPT

## 2025-01-03 PROCEDURE — 97016 VASOPNEUMATIC DEVICE THERAPY: CPT

## 2025-01-03 PROCEDURE — 97535 SELF CARE MNGMENT TRAINING: CPT

## 2025-01-03 PROCEDURE — 97530 THERAPEUTIC ACTIVITIES: CPT

## 2025-01-03 PROCEDURE — 97110 THERAPEUTIC EXERCISES: CPT

## 2025-01-03 NOTE — PROGRESS NOTES
PHYSICAL / OCCUPATIONAL THERAPY - DAILY TREATMENT NOTE     Patient Name: Paige Barahona    Date: 1/3/2025    : 1965  Insurance: Payor:  EAST / Plan: [employer] EAST PRIME / Product Type: *No Product type* /      Patient  verified Yes     Visit #   Current / Total 10 35   Time   In / Out 2:33 3:43   Pain   In / Out 0/10 1/10   Subjective Functional Status/Changes: \"I don't have any pain right now.\"   Changes to:  Allergies, Med Hx, Sx Hx?   no       TREATMENT AREA =  Pain in right knee [M25.561]    If an interpreting service is utilized for treatment of this patient, the contents of this document represent the material reviewed with the patient via the .     OBJECTIVE    Modalities Rationale:     decrease edema, decrease inflammation, and decrease pain to improve patient's ability to progress to PLOF and address remaining functional goals.     min [] Estim Unattended, type/location:                                      []  w/ice    []  w/heat    min [] Estim Attended, type/location:                                     []  w/US     []  w/ice    []  w/heat    []  TENS insruct      min []  Mechanical Traction: type/lbs                   []  pro   []  sup   []  int   []  cont    []  before manual    []  after manual    min []  Ultrasound, settings/location:      min []  Iontophoresis w/ dexamethasone, location:                                               []  take home patch       []  in clinic        min  unbilled []  Ice     []  Heat    location/position:     min []  Paraffin,  details:    10 min [x]  Vasopneumatic Device, press/temp: Med/    min []  Whirlpool / Fluido:    If using vaso (only need to measure limb vaso being performed on)      pre-treatment girth :  41 cm      post-treatment girth : 39.4 cm      measured at (landmark location) :  Mid-Patella    min []  Other:    Skin assessment post-treatment (if applicable):    [x]  intact    []  redness- no adverse reaction

## 2025-01-06 ENCOUNTER — HOSPITAL ENCOUNTER (OUTPATIENT)
Facility: HOSPITAL | Age: 60
Setting detail: RECURRING SERIES
Discharge: HOME | End: 2025-01-09
Payer: OTHER GOVERNMENT

## 2025-01-06 PROCEDURE — 97110 THERAPEUTIC EXERCISES: CPT

## 2025-01-06 PROCEDURE — 97530 THERAPEUTIC ACTIVITIES: CPT

## 2025-01-06 PROCEDURE — 97535 SELF CARE MNGMENT TRAINING: CPT

## 2025-01-06 PROCEDURE — 97112 NEUROMUSCULAR REEDUCATION: CPT

## 2025-01-06 PROCEDURE — 97016 VASOPNEUMATIC DEVICE THERAPY: CPT

## 2025-01-06 NOTE — PROGRESS NOTES
to return to prior functional activities and mobility.  Pre-Op Eval: 4-118 degs  Post-Op Reassess: right knee AROM 18-85, PROM 18-88  PN 1/6/2025: AROM: 6-107 deg Progressing     Patient will demonstrate 5/5 strength of bilateral LE's in order to be able to safely perform functional activities and demonstrate improved stability and strength.  Pre-Op Eval: 4+/5 bilateral lower extremity strength  Post-Op Reassess: will test when appropriate  PN 1/6/2025: see below    Left 1/6/25 Right 1/6/25   Hip Flexion 5/5 5/5   Hip Abd 4/5 4-/5   Hip Ext 3+/5 3+/5   Knee Flex 4+/5 4+/5   Knee Ext 5/5 4-/5   Dorsiflexion 5/5 5/5         Patient will be able to safely ambulate community distances without AD and functional gait mechanics in order to improve overall mobility and return patient to his or her PLOF.  Pre-Op Eval: WNL community distances without AD   Post-Op Reassess: FWW, step to pattern, slow valentina  PN 1/6/2025: Pt amb with SPC, demonstrating step through gait pattern with moderate knee flexion during swing phase and moderate antalgia noted  Progressing        Patient will be able to safely negotiate a full flight of stairs with a step-through pattern while holding onto at least one handrail in order to return to normal with this functional activity and improve safety on stairs.  Pre-Op Eval: step to pattern  Post-Op Reassess: deferred, will test when appropriate  PN 1/6/2025: pt able to perform stairs in clinic with step through gait mechanics with ascending and descending with significant UE support   Progressing     Patient will be able to perform at least 15 reps of sit <> stands with good form/control during 30\" STS test to demonstrate improved LE strength and stability, thus reducing the patients risk of falls.  Pre-Op Eval: NT  Post-Op Reassess: will test when appropriate               PN 1/6/2025: 30\" STS: 14 reps with Significant UE    Progressing       Summary of Care/ Key Functional Changes: Pt has 
test to demonstrate improved LE strength and stability, thus reducing the patients risk of falls.  Pre-Op Eval: NT  Post-Op Reassess: will test when appropriate    PN 1/6/2025: 30\" STS: 14 reps with Significant UE    Progressing      PLAN  Yes  Continue plan of care  []  Upgrade activities as tolerated  []  Discharge due to :  []  Other:    Juan David H Galo, PTA    1/6/2025    10:38 AM    Future Appointments   Date Time Provider Department Center   1/6/2025  4:30 PM Galo, Juan David H, PTA [facility]   1/13/2025  3:50 PM Carolyn Mancera, PT [facility]   1/14/2025  4:30 PM Bryn Escudero Jr., PTA [facility]   1/15/2025  4:30 PM Galo, Juan David H, PTA [facility]   1/20/2025  3:50 PM Carolyn Mancera, PT [facility]   1/21/2025  4:30 PM Bryn Escudero Jr., PTA [facility]   1/22/2025  5:10 PM Galo, Juan David H, PTA [facility]   1/27/2025  5:10 PM Galo, Juan David H, PTA [facility]   1/29/2025  5:10 PM Galo, Juan David H, PTA [facility]   2/3/2025  5:10 PM Galo, Juan David H, PTA [facility]   2/5/2025  5:10 PM Galo, Juan David H, PTA [facility]   2/6/2025  3:50 PM Bryn Escudero Jr., PTA [facility]   2/10/2025  5:10 PM Galo, Juan David H, PTA [facility]   2/12/2025  5:10 PM Theo, Jovita, PT [facility]   2/13/2025  3:50 PM Bryn Escudero Jr., PTA [facility]   2/17/2025  5:10 PM Galo, Juan David H, PTA [facility]   2/19/2025  5:10 PM Theo, Jovita, PT [facility]   2/20/2025  3:50 PM Destiney Estrada, PT [facility]

## 2025-01-08 ENCOUNTER — APPOINTMENT (OUTPATIENT)
Facility: HOSPITAL | Age: 60
End: 2025-01-08
Payer: OTHER GOVERNMENT

## 2025-01-10 ENCOUNTER — APPOINTMENT (OUTPATIENT)
Facility: HOSPITAL | Age: 60
End: 2025-01-10
Payer: OTHER GOVERNMENT

## 2025-01-13 ENCOUNTER — HOSPITAL ENCOUNTER (OUTPATIENT)
Facility: HOSPITAL | Age: 60
Setting detail: RECURRING SERIES
Discharge: HOME | End: 2025-01-16
Payer: OTHER GOVERNMENT

## 2025-01-13 PROCEDURE — 97016 VASOPNEUMATIC DEVICE THERAPY: CPT

## 2025-01-13 PROCEDURE — 97530 THERAPEUTIC ACTIVITIES: CPT

## 2025-01-13 PROCEDURE — 97110 THERAPEUTIC EXERCISES: CPT

## 2025-01-13 PROCEDURE — 97112 NEUROMUSCULAR REEDUCATION: CPT

## 2025-01-13 NOTE — PROGRESS NOTES
Progressing     Patient will demonstrate 5/5 strength of bilateral LE's in order to be able to safely perform functional activities and demonstrate improved stability and strength.  Pre-Op Eval: 4+/5 bilateral lower extremity strength  Post-Op Reassess: will test when appropriate  PN 1/6/2025: see below    Left 1/6/25 Right 1/6/25   Hip Flexion 5/5 5/5   Hip Abd 4/5 4-/5   Hip Ext 3+/5 3+/5   Knee Flex 4+/5 4+/5   Knee Ext 5/5 4-/5   Dorsiflexion 5/5 5/5      Patient will be able to safely ambulate community distances without AD and functional gait mechanics in order to improve overall mobility and return patient to his or her PLOF.  Pre-Op Eval: WNL community distances without AD   Post-Op Reassess: FWW, step to pattern, slow valentina  PN 1/6/2025: Pt amb with SPC, demonstrating step through gait pattern with moderate knee flexion during swing phase and moderate antalgia noted  Progressing     Patient will be able to safely negotiate a full flight of stairs with a step-through pattern while holding onto at least one handrail in order to return to normal with this functional activity and improve safety on stairs.  Pre-Op Eval: step to pattern  Post-Op Reassess: deferred, will test when appropriate  PN 1/6/2025: pt able to perform stairs in clinic with step through gait mechanics with ascending and descending with significant UE support   Progressing     Patient will be able to perform at least 15 reps of sit <> stands with good form/control during 30\" STS test to demonstrate improved LE strength and stability, thus reducing the patients risk of falls.  Pre-Op Eval: NT  Post-Op Reassess: will test when appropriate               PN 1/6/2025: 30\" STS: 14 reps with Significant UE    Progressing    PLAN  Yes  Continue plan of care  []  Upgrade activities as tolerated  []  Discharge due to :  []  Other:    Carolyn Mancera, PT    1/13/2025    8:34 AM    Future Appointments   Date Time Provider Department Center

## 2025-01-14 ENCOUNTER — HOSPITAL ENCOUNTER (OUTPATIENT)
Facility: HOSPITAL | Age: 60
Setting detail: RECURRING SERIES
Discharge: HOME | End: 2025-01-17
Payer: OTHER GOVERNMENT

## 2025-01-14 PROCEDURE — 97112 NEUROMUSCULAR REEDUCATION: CPT

## 2025-01-14 PROCEDURE — 97530 THERAPEUTIC ACTIVITIES: CPT

## 2025-01-14 PROCEDURE — 97016 VASOPNEUMATIC DEVICE THERAPY: CPT

## 2025-01-14 PROCEDURE — 97110 THERAPEUTIC EXERCISES: CPT

## 2025-01-14 NOTE — PROGRESS NOTES
PHYSICAL / OCCUPATIONAL THERAPY - DAILY TREATMENT NOTE     Patient Name: Paige Barahona    Date: 2025    : 1965  Insurance: Payor: [employer] EAST / Plan:  EAST PRIME / Product Type: *No Product type* /      Patient  verified Yes     Visit #   Current / Total 13 35   Time   In / Out 115 215   Pain   In / Out 2 1   Subjective Functional Status/Changes: Pt reports no new concerns   Changes to:  Allergies, Med Hx, Sx Hx?   no       TREATMENT AREA =  Pain in right knee [M25.561]    If an interpreting service is utilized for treatment of this patient, the contents of this document represent the material reviewed with the patient via the .     OBJECTIVE    Modalities Rationale:     decrease edema, decrease inflammation, and decrease pain to improve patient's ability to progress to PLOF and address remaining functional goals.     min [] Estim Unattended, type/location:                                      []  w/ice    []  w/heat    min [] Estim Attended, type/location:                                     []  w/US     []  w/ice    []  w/heat    []  TENS insruct      min []  Mechanical Traction: type/lbs                   []  pro   []  sup   []  int   []  cont    []  before manual    []  after manual    min []  Ultrasound, settings/location:      min []  Iontophoresis w/ dexamethasone, location:                                               []  take home patch       []  in clinic        min  unbilled []  Ice     []  Heat    location/position:     min []  Paraffin,  details:    10 min []  Vasopneumatic Device, press/temp:  Med 34    min []  Whirlpool / Fluido:    If using vaso (only need to measure limb vaso being performed on)      pre-treatment girth : 40.5 cm      post-treatment girth : 39.7 cm      measured at (landmark location) :  right mid patella    min []  Other:    Skin assessment post-treatment (if applicable):    []  intact    []  redness- no adverse reaction

## 2025-01-15 ENCOUNTER — HOSPITAL ENCOUNTER (OUTPATIENT)
Facility: HOSPITAL | Age: 60
Setting detail: RECURRING SERIES
Discharge: HOME | End: 2025-01-18
Payer: OTHER GOVERNMENT

## 2025-01-15 PROCEDURE — 97535 SELF CARE MNGMENT TRAINING: CPT

## 2025-01-15 PROCEDURE — 97110 THERAPEUTIC EXERCISES: CPT

## 2025-01-15 PROCEDURE — 97016 VASOPNEUMATIC DEVICE THERAPY: CPT

## 2025-01-15 PROCEDURE — 97530 THERAPEUTIC ACTIVITIES: CPT

## 2025-01-15 NOTE — PROGRESS NOTES
10/10 at worst, 8/10 pain current  PN 1/6/2025: 6/10 at worst in the past week, 3/10 on average Progressing     Patient will demonstrate at least 0-120 degs of post-surgical knee AROM in order to return to prior functional activities and mobility.  Pre-Op Eval: 4-118 degs  Post-Op Reassess: right knee AROM 18-85, PROM 18-88  PN 1/6/2025: AROM: 6-107 deg Progressing     Patient will demonstrate 5/5 strength of bilateral LE's in order to be able to safely perform functional activities and demonstrate improved stability and strength.  Pre-Op Eval: 4+/5 bilateral lower extremity strength  Post-Op Reassess: will test when appropriate  PN 1/6/2025: see below    Left 1/6/25 Right 1/6/25   Hip Flexion 5/5 5/5   Hip Abd 4/5 4-/5   Hip Ext 3+/5 3+/5   Knee Flex 4+/5 4+/5   Knee Ext 5/5 4-/5   Dorsiflexion 5/5 5/5      Patient will be able to safely ambulate community distances without AD and functional gait mechanics in order to improve overall mobility and return patient to his or her PLOF.  Pre-Op Eval: WNL community distances without AD   Post-Op Reassess: FWW, step to pattern, slow valentina  PN 1/6/2025: Pt amb with SPC, demonstrating step through gait pattern with moderate knee flexion during swing phase and moderate antalgia noted  Progressing  Current: Pt continues to demonstrate slight lack of TKE during ambulation but, is able to perform moderate heel strike 1/15/25     Patient will be able to safely negotiate a full flight of stairs with a step-through pattern while holding onto at least one handrail in order to return to normal with this functional activity and improve safety on stairs.  Pre-Op Eval: step to pattern  Post-Op Reassess: deferred, will test when appropriate  PN 1/6/2025: pt able to perform stairs in clinic with step through gait mechanics with ascending and descending with significant UE support   Progressing     Patient will be able to perform at least 15 reps of sit <> stands with good form/control

## 2025-01-17 ENCOUNTER — APPOINTMENT (OUTPATIENT)
Facility: HOSPITAL | Age: 60
End: 2025-01-17
Payer: OTHER GOVERNMENT

## 2025-01-20 ENCOUNTER — HOSPITAL ENCOUNTER (OUTPATIENT)
Facility: HOSPITAL | Age: 60
Setting detail: RECURRING SERIES
Discharge: HOME | End: 2025-01-23
Payer: OTHER GOVERNMENT

## 2025-01-20 PROCEDURE — 97112 NEUROMUSCULAR REEDUCATION: CPT

## 2025-01-20 PROCEDURE — 97016 VASOPNEUMATIC DEVICE THERAPY: CPT

## 2025-01-20 PROCEDURE — 97530 THERAPEUTIC ACTIVITIES: CPT

## 2025-01-20 PROCEDURE — 97110 THERAPEUTIC EXERCISES: CPT

## 2025-01-20 PROCEDURE — 97535 SELF CARE MNGMENT TRAINING: CPT

## 2025-01-20 NOTE — PROGRESS NOTES
6/10 at worst in the past week, 3/10 on average Progressing  Current: 3/10 at worst in the past week 1/20/25     Patient will demonstrate at least 0-120 degs of post-surgical knee AROM in order to return to prior functional activities and mobility.  Pre-Op Eval: 4-118 degs  Post-Op Reassess: right knee AROM 18-85, PROM 18-88  PN 1/6/2025: AROM: 6-107 deg Progressing     Patient will demonstrate 5/5 strength of bilateral LE's in order to be able to safely perform functional activities and demonstrate improved stability and strength.  Pre-Op Eval: 4+/5 bilateral lower extremity strength  Post-Op Reassess: will test when appropriate  PN 1/6/2025: see below    Left 1/6/25 Right 1/6/25   Hip Flexion 5/5 5/5   Hip Abd 4/5 4-/5   Hip Ext 3+/5 3+/5   Knee Flex 4+/5 4+/5   Knee Ext 5/5 4-/5   Dorsiflexion 5/5 5/5      Patient will be able to safely ambulate community distances without AD and functional gait mechanics in order to improve overall mobility and return patient to his or her PLOF.  Pre-Op Eval: WNL community distances without AD   Post-Op Reassess: FWW, step to pattern, slow valentina  PN 1/6/2025: Pt amb with SPC, demonstrating step through gait pattern with moderate knee flexion during swing phase and moderate antalgia noted  Progressing  Current: Pt continues to demonstrate slight lack of TKE during ambulation but, is able to perform moderate heel strike 1/15/25     Patient will be able to safely negotiate a full flight of stairs with a step-through pattern while holding onto at least one handrail in order to return to normal with this functional activity and improve safety on stairs.  Pre-Op Eval: step to pattern  Post-Op Reassess: deferred, will test when appropriate  PN 1/6/2025: pt able to perform stairs in clinic with step through gait mechanics with ascending and descending with significant UE support   Progressing     Patient will be able to perform at least 15 reps of sit <> stands with good form/control

## 2025-01-21 ENCOUNTER — TELEPHONE (OUTPATIENT)
Facility: HOSPITAL | Age: 60
End: 2025-01-21

## 2025-01-21 ENCOUNTER — APPOINTMENT (OUTPATIENT)
Facility: HOSPITAL | Age: 60
End: 2025-01-21
Payer: OTHER GOVERNMENT

## 2025-01-21 NOTE — TELEPHONE ENCOUNTER
Called pt to inform them that we are canceling her 1/22 appt. due to not reciving auth from her insurance yet and we will call her if we need ti cancel anyq further appts.

## 2025-01-22 ENCOUNTER — APPOINTMENT (OUTPATIENT)
Facility: HOSPITAL | Age: 60
End: 2025-01-22
Payer: OTHER GOVERNMENT

## 2025-01-24 ENCOUNTER — APPOINTMENT (OUTPATIENT)
Facility: HOSPITAL | Age: 60
End: 2025-01-24
Payer: OTHER GOVERNMENT

## 2025-01-27 ENCOUNTER — HOSPITAL ENCOUNTER (OUTPATIENT)
Facility: HOSPITAL | Age: 60
Setting detail: RECURRING SERIES
Discharge: HOME | End: 2025-01-30
Payer: OTHER GOVERNMENT

## 2025-01-27 PROCEDURE — 97112 NEUROMUSCULAR REEDUCATION: CPT

## 2025-01-27 PROCEDURE — 97535 SELF CARE MNGMENT TRAINING: CPT

## 2025-01-27 PROCEDURE — 97530 THERAPEUTIC ACTIVITIES: CPT

## 2025-01-27 PROCEDURE — 97110 THERAPEUTIC EXERCISES: CPT

## 2025-01-27 PROCEDURE — 97016 VASOPNEUMATIC DEVICE THERAPY: CPT

## 2025-01-27 NOTE — PROGRESS NOTES
PHYSICAL / OCCUPATIONAL THERAPY - DAILY TREATMENT NOTE     Patient Name: Paige Barahona    Date: 2025    : 1965  Insurance: Payor:  EAST / Plan: [employer] EAST PRIME / Product Type: *No Product type* /      Patient  verified Yes     Visit #   Current / Total 16 35   Time   In / Out 2:30 3:45   Pain   In / Out 1/10 1/10   Subjective Functional Status/Changes: \"I don't have too much pain right now.\"   Changes to:  Allergies, Med Hx, Sx Hx?   no       TREATMENT AREA =  Pain in right knee [M25.561]    If an interpreting service is utilized for treatment of this patient, the contents of this document represent the material reviewed with the patient via the .     OBJECTIVE    Modalities Rationale:     decrease edema, decrease inflammation, and decrease pain to improve patient's ability to progress to PLOF and address remaining functional goals.     min [] Estim Unattended, type/location:                                      []  w/ice    []  w/heat    min [] Estim Attended, type/location:                                     []  w/US     []  w/ice    []  w/heat    []  TENS insruct      min []  Mechanical Traction: type/lbs                   []  pro   []  sup   []  int   []  cont    []  before manual    []  after manual    min []  Ultrasound, settings/location:      min []  Iontophoresis w/ dexamethasone, location:                                               []  take home patch       []  in clinic        min  unbilled []  Ice     []  Heat    location/position:     min []  Paraffin,  details:    10 min [x]  Vasopneumatic Device, press/temp: Med/Low    min []  Whirlpool / Fluido:    If using vaso (only need to measure limb vaso being performed on)      pre-treatment girth : 41.5 cm      post-treatment girth : 40.5 cm      measured at (landmark location) :  Mid-Patella    min []  Other:    Skin assessment post-treatment (if applicable):    [x]  intact    []  redness- no adverse reaction

## 2025-01-29 ENCOUNTER — HOSPITAL ENCOUNTER (OUTPATIENT)
Facility: HOSPITAL | Age: 60
Setting detail: RECURRING SERIES
Discharge: HOME | End: 2025-02-01
Payer: OTHER GOVERNMENT

## 2025-01-29 PROCEDURE — 97112 NEUROMUSCULAR REEDUCATION: CPT

## 2025-01-29 PROCEDURE — 97016 VASOPNEUMATIC DEVICE THERAPY: CPT

## 2025-01-29 PROCEDURE — 97530 THERAPEUTIC ACTIVITIES: CPT

## 2025-01-29 PROCEDURE — 97535 SELF CARE MNGMENT TRAINING: CPT

## 2025-01-29 PROCEDURE — 97110 THERAPEUTIC EXERCISES: CPT

## 2025-01-29 NOTE — PROGRESS NOTES
PHYSICAL / OCCUPATIONAL THERAPY - DAILY TREATMENT NOTE     Patient Name: Paige Barahona    Date: 2025    : 1965  Insurance: Payor:  EAST / Plan: [employer] EAST PRIME / Product Type: *No Product type* /      Patient  verified Yes     Visit #   Current / Total 17 35   Time   In / Out 7:56 8:44   Pain   In / Out 0 0   Subjective Functional Status/Changes: Patient reports no pain this morning; returning to work on 2/10   Changes to:  Allergies, Med Hx, Sx Hx?   no       TREATMENT AREA =  Pain in right knee [M25.561]    If an interpreting service is utilized for treatment of this patient, the contents of this document represent the material reviewed with the patient via the .     OBJECTIVE    Modalities Rationale:     decrease edema and decrease inflammation to improve patient's ability to progress to PLOF and address remaining functional goals.     min [] Estim Unattended, type/location:                                      []  w/ice    []  w/heat    min [] Estim Attended, type/location:                                     []  w/US     []  w/ice    []  w/heat    []  TENS insruct      min []  Mechanical Traction: type/lbs                   []  pro   []  sup   []  int   []  cont    []  before manual    []  after manual    min []  Ultrasound, settings/location:      min []  Iontophoresis w/ dexamethasone, location:                                               []  take home patch       []  in clinic        min  unbilled []  Ice     []  Heat    location/position:     min []  Paraffin,  details:    10 min [x]  Vasopneumatic Device, press/temp:     min []  Whirlpool / Fluido:    If using vaso (only need to measure limb vaso being performed on)      pre-treatment girth : 39.5 cm      post-treatment girth : 39 cm      measured at (landmark location) :  mid patella    min []  Other:    Skin assessment post-treatment (if applicable):    []  intact    []  redness- no adverse reaction

## 2025-01-31 ENCOUNTER — APPOINTMENT (OUTPATIENT)
Facility: HOSPITAL | Age: 60
End: 2025-01-31
Payer: OTHER GOVERNMENT

## 2025-02-03 ENCOUNTER — HOSPITAL ENCOUNTER (OUTPATIENT)
Facility: HOSPITAL | Age: 60
Setting detail: RECURRING SERIES
Discharge: HOME | End: 2025-02-06
Payer: OTHER GOVERNMENT

## 2025-02-03 PROCEDURE — 97535 SELF CARE MNGMENT TRAINING: CPT

## 2025-02-03 PROCEDURE — 97016 VASOPNEUMATIC DEVICE THERAPY: CPT

## 2025-02-03 PROCEDURE — 97530 THERAPEUTIC ACTIVITIES: CPT

## 2025-02-03 PROCEDURE — 97112 NEUROMUSCULAR REEDUCATION: CPT

## 2025-02-03 PROCEDURE — 97110 THERAPEUTIC EXERCISES: CPT

## 2025-02-03 NOTE — PROGRESS NOTES
PHYSICAL / OCCUPATIONAL THERAPY - DAILY TREATMENT NOTE     Patient Name: Paige Barahona    Date: 2/3/2025    : 1965  Insurance: Payor:  EAST / Plan: [employer] EAST PRIME / Product Type: *No Product type* /      Patient  verified Yes     Visit #   Current / Total 18 35   Time   In / Out 4:57 6:08   Pain   In / Out 0/10 0/10   Subjective Functional Status/Changes: \"I don't have any pain right now.\"   Changes to:  Allergies, Med Hx, Sx Hx?   no       TREATMENT AREA =  Pain in right knee [M25.561]    If an interpreting service is utilized for treatment of this patient, the contents of this document represent the material reviewed with the patient via the .     OBJECTIVE    Modalities Rationale:     decrease edema, decrease inflammation, and decrease pain to improve patient's ability to progress to PLOF and address remaining functional goals.     min [] Estim Unattended, type/location:                                      []  w/ice    []  w/heat    min [] Estim Attended, type/location:                                     []  w/US     []  w/ice    []  w/heat    []  TENS insruct      min []  Mechanical Traction: type/lbs                   []  pro   []  sup   []  int   []  cont    []  before manual    []  after manual    min []  Ultrasound, settings/location:      min []  Iontophoresis w/ dexamethasone, location:                                               []  take home patch       []  in clinic        min  unbilled []  Ice     []  Heat    location/position:     min []  Paraffin,  details:    10 min [x]  Vasopneumatic Device, press/temp: Med/Low    min []  Whirlpool / Fluido:    If using vaso (only need to measure limb vaso being performed on)      pre-treatment girth : 37.8 cm      post-treatment girth : 37 cm      measured at (landmark location) :  [place]    min []  Other:    Skin assessment post-treatment (if applicable):    [x]  intact    []  redness- no adverse reaction                  Caregiver

## 2025-02-05 ENCOUNTER — HOSPITAL ENCOUNTER (OUTPATIENT)
Facility: HOSPITAL | Age: 60
Setting detail: RECURRING SERIES
Discharge: HOME | End: 2025-02-08
Payer: OTHER GOVERNMENT

## 2025-02-05 PROCEDURE — 97110 THERAPEUTIC EXERCISES: CPT

## 2025-02-05 PROCEDURE — 97530 THERAPEUTIC ACTIVITIES: CPT

## 2025-02-05 PROCEDURE — 97016 VASOPNEUMATIC DEVICE THERAPY: CPT

## 2025-02-05 PROCEDURE — 97112 NEUROMUSCULAR REEDUCATION: CPT

## 2025-02-05 NOTE — PROGRESS NOTES
PHYSICAL / OCCUPATIONAL THERAPY - DAILY TREATMENT NOTE     Patient Name: Paige Barahona    Date: 2025    : 1965  Insurance: Payor:  EAST / Plan:  EAST PRIME / Product Type: *No Product type* /      Patient  verified Yes     Visit #   Current / Total 19 35   Time   In / Out 12:28 1:35   Pain   In / Out 0/10 110   Subjective Functional Status/Changes: Pt has no reports of p! upon entering clinic today.   Changes to:  Allergies, Med Hx, Sx Hx?   no       TREATMENT AREA =  Pain in right knee [M25.561]    If an interpreting service is utilized for treatment of this patient, the contents of this document represent the material reviewed with the patient via the .     OBJECTIVE    Modalities Rationale:     decrease pain and increase tissue extensibility to improve patient's ability to progress to PLOF and address remaining functional goals.     min [] Estim Unattended, type/location:                                      []  w/ice    []  w/heat    min [] Estim Attended, type/location:                                     []  w/US     []  w/ice    []  w/heat    []  TENS insruct      min []  Mechanical Traction: type/lbs                   []  pro   []  sup   []  int   []  cont    []  before manual    []  after manual    min []  Ultrasound, settings/location:      min []  Iontophoresis w/ dexamethasone, location:                                               []  take home patch       []  in clinic        min  unbilled []  Ice     []  Heat    location/position:     min []  Paraffin,  details:    10 min [x]  Vasopneumatic Device, press/temp:     min []  Whirlpool / Fluido:    If using vaso (only need to measure limb vaso being performed on)      pre-treatment girth : 38 cm      post-treatment girth : 38 cm       measured at (landmark location) :  patella    min []  Other:    Skin assessment post-treatment (if applicable):    []  intact    []  redness- no adverse reaction                 
2/5/2025: 30\" STS: 18 reps   MET      Long Term Goals:     to be accomplished within 35 treatments:     Patient will score 78/80 points or higher on Lower Extremity Functional Scale (LEFS) to meet MCID and show improvement with functional activities and ADL's.              Scoring:           MCID = 9 points                                      61-80 = minimal-to-no limitation                     21-40 = moderate limitation                                      41-60 = mild-to-moderate limitation                0-20 = severe limitation  Eval: 78/80 on LEFS   Post-Op Reassess: deferred              PN 1/6/2025: LEFS: 58/80   Initial Regression post surgery              PN 2/5/2025: to be tested at future visit          Patient will report an average daily pain of 2/10 or less during all functional activities in order to improve QOL and return to patient's PLOF.  Pre-Op Eval: average daily pain of 3/10, 8/10 pain at worst  Post-Op Reassess: 10/10 at worst, 8/10 pain current  PN 1/6/2025: 6/10 at worst in the past week, 3/10 on average Progressing  Current: 3/10 at worst in the past week 1/20/25  PN 2/5/2025: 4/10 at worst in the past week   SLIGHT REGRESSION      Patient will demonstrate at least 0-120 degs of post-surgical knee AROM in order to return to prior functional activities and mobility.  Pre-Op Eval: 4-118 degs  Post-Op Reassess: right knee AROM 18-85, PROM 18-88  PN 1/6/2025: AROM: 6-107 deg Progressing  PN 2/5/2025: 6-110   PROGRESSING      Patient will demonstrate 5/5 strength of bilateral LE's in order to be able to safely perform functional activities and demonstrate improved stability and strength.  Pre-Op Eval: 4+/5 bilateral lower extremity strength  Post-Op Reassess: will test when appropriate  PN 1/6/2025: see below    Left 1/6/25 Right 1/6/25   Hip Flexion 5/5 5/5   Hip Abd 4/5 4-/5   Hip Ext 3+/5 3+/5   Knee Flex 4+/5 4+/5   Knee Ext 5/5 4-/5   Dorsiflexion 5/5 5/5   PN 2/5/2025: see below

## 2025-02-06 ENCOUNTER — APPOINTMENT (OUTPATIENT)
Facility: HOSPITAL | Age: 60
End: 2025-02-06
Payer: OTHER GOVERNMENT

## 2025-02-07 ENCOUNTER — APPOINTMENT (OUTPATIENT)
Facility: HOSPITAL | Age: 60
End: 2025-02-07
Payer: OTHER GOVERNMENT

## 2025-02-10 ENCOUNTER — HOSPITAL ENCOUNTER (OUTPATIENT)
Facility: HOSPITAL | Age: 60
Setting detail: RECURRING SERIES
Discharge: HOME | End: 2025-02-13
Payer: OTHER GOVERNMENT

## 2025-02-10 PROCEDURE — 97530 THERAPEUTIC ACTIVITIES: CPT

## 2025-02-10 PROCEDURE — 97110 THERAPEUTIC EXERCISES: CPT

## 2025-02-10 PROCEDURE — 97112 NEUROMUSCULAR REEDUCATION: CPT

## 2025-02-10 PROCEDURE — 97535 SELF CARE MNGMENT TRAINING: CPT

## 2025-02-10 NOTE — PROGRESS NOTES
to demonstrate improved LE strength and stability during this functional activity, thus reducing the patients risk of falls.  Pre-Op Eval: NT               Post-Op Reassess: will test when appropriate               PN 1/6/2025: 30\" STS: 14 reps with Significant UE    Progressing              PN 2/5/2025: 30\" STS: 18 reps   MET      Long Term Goals:     to be accomplished within 35 treatments:     Patient will score 78/80 points or higher on Lower Extremity Functional Scale (LEFS) to meet MCID and show improvement with functional activities and ADL's.              Scoring:           MCID = 9 points                                      61-80 = minimal-to-no limitation                     21-40 = moderate limitation                                      41-60 = mild-to-moderate limitation                0-20 = severe limitation  Eval: 78/80 on LEFS   Post-Op Reassess: deferred              PN 1/6/2025: LEFS: 58/80   Initial Regression post surgery              PN 2/5/2025: to be tested at future visit          Patient will report an average daily pain of 2/10 or less during all functional activities in order to improve QOL and return to patient's PLOF.  Pre-Op Eval: average daily pain of 3/10, 8/10 pain at worst  Post-Op Reassess: 10/10 at worst, 8/10 pain current  PN 1/6/2025: 6/10 at worst in the past week, 3/10 on average Progressing  Current: 3/10 at worst in the past week 1/20/25  PN 2/5/2025: 4/10 at worst in the past week   SLIGHT REGRESSION      Patient will demonstrate at least 0-120 degs of post-surgical knee AROM in order to return to prior functional activities and mobility.  Pre-Op Eval: 4-118 degs  Post-Op Reassess: right knee AROM 18-85, PROM 18-88  PN 1/6/2025: AROM: 6-107 deg Progressing  PN 2/5/2025: 6-110   PROGRESSING   Current: pt continues to demonstrate lack of TKE during ambulation 2/10/25     Patient will demonstrate 5/5 strength of bilateral LE's in order to be able to safely perform functional

## 2025-02-12 ENCOUNTER — HOSPITAL ENCOUNTER (OUTPATIENT)
Facility: HOSPITAL | Age: 60
Setting detail: RECURRING SERIES
Discharge: HOME | End: 2025-02-15
Payer: OTHER GOVERNMENT

## 2025-02-12 PROCEDURE — 97112 NEUROMUSCULAR REEDUCATION: CPT

## 2025-02-12 PROCEDURE — 97530 THERAPEUTIC ACTIVITIES: CPT

## 2025-02-12 PROCEDURE — 97110 THERAPEUTIC EXERCISES: CPT

## 2025-02-12 NOTE — PROGRESS NOTES
education  Post-Op Reassess: HEP reviewed with patient today, along with edema reduction education  Current: Patient reports compliance with their HEP.   12/17/2024, MET      Patient will report at least a 25% reduction in pain/symptoms when performing ADLs/functional activities in order to improve overall tolerance to functional movements and progress towards PLOF.  Pre-Op Eval:  average daily pain of 3/10, 8/10 pain at worst  Post-Op Reassess: 10/10 at worst, 8/10 pain current  12/30/24: 8/10 at worst; on average 5/10 MET      Patient will demonstrate at least 0-90 degs of post-surgical knee AROM to reduce risk of contracture and improve gait mechanics.  Pre-Op Eval: 4-118 degs  Post-Op Reassess: right knee AROM 18-85, PROM 18-88  PN 1/6/2025: AROM: 6-107 deg  Progressing  PN 2/5/2025: 6-110   PROGRESSING      Patient will be able to safely at least 500' with SPC and functional gait mechanics to improve patient's ambulation when attending doctor's appointments.  Pre-Op Eval: WNL community distances without AD   Post-Op Reassess: FWW, step to pattern, slow valentina  PN 1/6/2025: Pt demonstrates step through gait mechanics with SPC with moderate bilateral heel strike with decreased knee flexion during swing phase and mild antalgia   Progressing  Current: Pt amb in clinic without AD at this time with good step through gait mechanics with focused heel toe mechanics but, occasional slight antalgic compensation step 1/27/25 1/29: no AD, slight lateral trunk lean and lack of TKE, improved with min vc  PN 2/5/2025: no AD, reduced arm swing, lack of TKE   PROGRESSING      Patient will be able to perform at least 10 reps of sit <> stands with good form/control during 30\" STS test to demonstrate improved LE strength and stability during this functional activity, thus reducing the patients risk of falls.  Pre-Op Eval: NT               Post-Op Reassess: will test when appropriate               PN 1/6/2025: 30\" STS: 14 reps

## 2025-02-13 ENCOUNTER — APPOINTMENT (OUTPATIENT)
Facility: HOSPITAL | Age: 60
End: 2025-02-13
Payer: OTHER GOVERNMENT

## 2025-02-14 ENCOUNTER — APPOINTMENT (OUTPATIENT)
Facility: HOSPITAL | Age: 60
End: 2025-02-14
Payer: OTHER GOVERNMENT

## 2025-02-17 ENCOUNTER — HOSPITAL ENCOUNTER (OUTPATIENT)
Facility: HOSPITAL | Age: 60
Setting detail: RECURRING SERIES
Discharge: HOME | End: 2025-02-20
Payer: OTHER GOVERNMENT

## 2025-02-17 PROCEDURE — 97530 THERAPEUTIC ACTIVITIES: CPT

## 2025-02-17 PROCEDURE — 97110 THERAPEUTIC EXERCISES: CPT

## 2025-02-17 PROCEDURE — 97112 NEUROMUSCULAR REEDUCATION: CPT

## 2025-02-17 NOTE — PROGRESS NOTES
PHYSICAL / OCCUPATIONAL THERAPY - DAILY TREATMENT NOTE     Patient Name: Paige Barahona    Date: 2025    : 1965  Insurance: Payor: [employer] EAST / Plan: [employer] EAST PRIME / Product Type: *No Product type* /      Patient  verified Yes     Visit #   Current / Total 22 35   Time   In / Out 1:50 2:30   Pain   In / Out 2 0   Subjective Functional Status/Changes: Patient has no new complaints.   Changes to:  Allergies, Med Hx, Sx Hx?   yes       TREATMENT AREA =  Pain in right knee [M25.561]    If an interpreting service is utilized for treatment of this patient, the contents of this document represent the material reviewed with the patient via the .     OBJECTIVE    Therapeutic Procedures:  Tx Min Billable or 1:1 Min (if diff from Tx Min) Procedure, Rationale, Specifics   14  09678 Therapeutic Exercise (timed):  increase ROM, strength, coordination, balance, and proprioception to improve patient's ability to progress to PLOF and address remaining functional goals. (see flow sheet as applicable)    Details if applicable:       14  18115 Therapeutic Activity (timed):  use of dynamic activities replicating functional movements to increase ROM, strength, coordination, balance, and proprioception in order to improve patient's ability to progress to PLOF and address remaining functional goals.  (see flow sheet as applicable)    Details if applicable:     12  84284 Neuromuscular Re-Education (timed):  improve balance, coordination, kinesthetic sense, posture, core stability and proprioception to improve patient's ability to develop conscious control of individual muscles and awareness of position of extremities in order to progress to PLOF and address remaining functional goals. (see flow sheet as applicable)     Details if applicable:     40  [facility] Totals Reminder: bill using total billable min of TIMED therapeutic procedures (example: do not include dry needle or estim unattended, both untimed

## 2025-02-18 ENCOUNTER — TELEPHONE (OUTPATIENT)
Facility: HOSPITAL | Age: 60
End: 2025-02-18

## 2025-02-19 ENCOUNTER — APPOINTMENT (OUTPATIENT)
Facility: HOSPITAL | Age: 60
End: 2025-02-19
Payer: OTHER GOVERNMENT

## 2025-02-20 ENCOUNTER — APPOINTMENT (OUTPATIENT)
Facility: HOSPITAL | Age: 60
End: 2025-02-20
Payer: OTHER GOVERNMENT

## 2025-02-21 ENCOUNTER — APPOINTMENT (OUTPATIENT)
Facility: HOSPITAL | Age: 60
End: 2025-02-21
Payer: OTHER GOVERNMENT

## 2025-02-25 ENCOUNTER — HOSPITAL ENCOUNTER (OUTPATIENT)
Facility: HOSPITAL | Age: 60
Setting detail: RECURRING SERIES
End: 2025-02-25
Payer: OTHER GOVERNMENT

## 2025-02-26 ENCOUNTER — HOSPITAL ENCOUNTER (OUTPATIENT)
Facility: HOSPITAL | Age: 60
Setting detail: RECURRING SERIES
Discharge: HOME | End: 2025-03-01
Payer: OTHER GOVERNMENT

## 2025-02-26 PROCEDURE — 97110 THERAPEUTIC EXERCISES: CPT

## 2025-02-26 PROCEDURE — 97112 NEUROMUSCULAR REEDUCATION: CPT

## 2025-02-26 PROCEDURE — 97535 SELF CARE MNGMENT TRAINING: CPT

## 2025-02-26 PROCEDURE — 97530 THERAPEUTIC ACTIVITIES: CPT

## 2025-02-26 PROCEDURE — 97016 VASOPNEUMATIC DEVICE THERAPY: CPT

## 2025-02-26 NOTE — PROGRESS NOTES
PHYSICAL / OCCUPATIONAL THERAPY - DAILY TREATMENT NOTE     Patient Name: Paige Barahona    Date: 2025    : 1965  Insurance: Payor:  EAST / Plan: [employer] EAST PRIME / Product Type: *No Product type* /      Patient  verified Yes     Visit #   Current / Total 23 35   Time   In / Out 4:55 5:10   Pain   In / Out 0/10 0/10   Subjective Functional Status/Changes: \"I don't have any pain right now.\"   Changes to:  Allergies, Med Hx, Sx Hx?   no       TREATMENT AREA =  Pain in right knee [M25.561]    If an interpreting service is utilized for treatment of this patient, the contents of this document represent the material reviewed with the patient via the .     OBJECTIVE    Modalities Rationale:     decrease edema, decrease inflammation, and decrease pain to improve patient's ability to progress to PLOF and address remaining functional goals.     min [] Estim Unattended, type/location:                                      []  w/ice    []  w/heat    min [] Estim Attended, type/location:                                     []  w/US     []  w/ice    []  w/heat    []  TENS insruct      min []  Mechanical Traction: type/lbs                   []  pro   []  sup   []  int   []  cont    []  before manual    []  after manual    min []  Ultrasound, settings/location:      min []  Iontophoresis w/ dexamethasone, location:                                               []  take home patch       []  in clinic        min  unbilled []  Ice     []  Heat    location/position:     min []  Paraffin,  details:    10 min [x]  Vasopneumatic Device, press/temp: Med/Low    min []  Whirlpool / Fluido:    If using vaso (only need to measure limb vaso being performed on)      pre-treatment girth : 41 cm      post-treatment girth : 40 cm      measured at (landmark location) :  Mid-patella    min []  Other:    Skin assessment post-treatment (if applicable):    [x]  intact    []  redness- no adverse reaction

## 2025-03-03 ENCOUNTER — HOSPITAL ENCOUNTER (OUTPATIENT)
Facility: HOSPITAL | Age: 60
Setting detail: RECURRING SERIES
Discharge: HOME | End: 2025-03-06
Payer: OTHER GOVERNMENT

## 2025-03-03 PROCEDURE — 97530 THERAPEUTIC ACTIVITIES: CPT

## 2025-03-03 PROCEDURE — 97535 SELF CARE MNGMENT TRAINING: CPT

## 2025-03-03 PROCEDURE — 97110 THERAPEUTIC EXERCISES: CPT

## 2025-03-03 NOTE — PROGRESS NOTES
patient's ability to progress to PLOF and address remaining functional goals.  (see flow sheet as applicable)    Details if applicable:            Details if applicable:     40  [facility] Totals Reminder: bill using total billable min of TIMED therapeutic procedures (example: do not include dry needle or estim unattended, both untimed codes, in totals to left)  8-22 min = 1 unit; 23-37 min = 2 units; 38-52 min = 3 units; 53-67 min = 4 units; 68-82 min = 5 units   Total Total     TOTAL TREATMENT TIME:        40     [x]  Patient Education billed concurrently with other procedures   [x] Review HEP    [] Progressed/Changed HEP, detail:    [] Other detail:       Objective Information/Functional Measures/Assessment    Pt has attended 23 visits of physical therapy for Right knee Pain secondary to Right TKA on 12/11/24. Pt has continue to make slow progress towards goals established at [facility]. Pt has met many goals at this time with slow progress towards several others. Pt continues to demonstrate slow progress with general strength but, continued lack of progress with AROM. Pt has returned to all normal, daily activities with minimal difficulty at this time including demonstrating step through stair ambulation without UE support. Pt reports due to return to prior level of activity and being independent with HEP, pt is no longer in need of therapy. Pt is self-discharged to independent [facility] at this time.    Patient will continue to benefit from skilled PT / OT services to modify and progress therapeutic interventions, analyze and address functional mobility deficits, analyze and address ROM deficits, analyze and address strength deficits, analyze and address soft tissue restrictions, and analyze and cue for proper movement patterns to address functional deficits and attain remaining goals.    Progress toward goals / Updated goals:  []  See Progress Note/Recertification    Short Term Goals:  to be accomplished within 18

## 2025-03-05 ENCOUNTER — TELEPHONE (OUTPATIENT)
Facility: HOSPITAL | Age: 60
End: 2025-03-05

## 2025-03-05 ENCOUNTER — HOSPITAL ENCOUNTER (OUTPATIENT)
Facility: HOSPITAL | Age: 60
Setting detail: RECURRING SERIES
End: 2025-03-05
Payer: OTHER GOVERNMENT

## 2025-03-05 NOTE — TELEPHONE ENCOUNTER
Patient called to make sure today's appt was cxled due to her being DC'd on Monday an getting a reminder text for today. Cxled todays appt.

## (undated) DEVICE — GLOVE SURG SZ 75 L12IN FNGR THK79MIL GRN LTX FREE

## (undated) DEVICE — PEEL-AWAY TOGA, X-LARGE: Brand: FLYTE

## (undated) DEVICE — WET SKIN PREP TRAY: Brand: MEDLINE INDUSTRIES, INC.

## (undated) DEVICE — SPONGE GZ W4XL4IN COT 12 PLY TYP VII WVN C FLD DSGN STERILE

## (undated) DEVICE — Device

## (undated) DEVICE — ZIMMER® STERILE DISPOSABLE TOURNIQUET CUFF WITH PROTECTIVE SLEEVE AND PLC, SINGLE PORT, SINGLE BLADDER, 34 IN. (86 CM)

## (undated) DEVICE — SOLUTION,WATER,IRRIGATION,500ML,STERILE: Brand: MEDLINE

## (undated) DEVICE — SPLINT MAT XF SPEC 5X30IN --

## (undated) DEVICE — PAD,ABDOMINAL,5"X9",ST,LF,25/BX: Brand: MEDLINE INDUSTRIES, INC.

## (undated) DEVICE — SUTURE STRATAFIX SYMMETRIC PDS + SZ 1 L18IN ABSRB VLT L48MM SXPP1A400

## (undated) DEVICE — KIT PROC EXTRM HND FT CUST LF --

## (undated) DEVICE — PIN BNE FIX TEMP L110MM DIA4MM MAKO

## (undated) DEVICE — 3M™ STERI-DRAPE™ U-DRAPE 1015: Brand: STERI-DRAPE™

## (undated) DEVICE — GARMENT COMPR M FOR 13IN FT INTMIT SGL BLDR HEM FORC II

## (undated) DEVICE — HANDPIECE SET WITH HIGH FLOW TIP AND SUCTION TUBE: Brand: INTERPULSE

## (undated) DEVICE — DRAPE,HAND,STERILE: Brand: MEDLINE

## (undated) DEVICE — PIN BNE FIX TEMP L140MM DIA4MM MAKO

## (undated) DEVICE — SUTURE MCRYL SZ 4-0 L18IN ABSRB UD L19MM PS-2 3/8 CIR PRIM Y496G

## (undated) DEVICE — PREP SKN CHLRAPRP 26ML TNT -- CONVERT TO ITEM 373320

## (undated) DEVICE — SUTURE VICRYL + SZ 2-0 L27IN ABSRB UD CT-2 L26MM 1/2 CIR TAPR VCP269H

## (undated) DEVICE — SLEEVE COMPR THGH LEN MED TEAR AWAY GARM SCD EXPRESS [DVT30] [MEDTRONIC COVIDIEN KENDALL]

## (undated) DEVICE — CORD RETRCT SIL - ORDER MULTIPLES OF 10 EACH

## (undated) DEVICE — KIT DRP FOR RIO ROBOTIC ARM ASST SYS

## (undated) DEVICE — KIT DRP FOR RIO ROBOTIC ARM ASST SYS (ORDER MUTLIPLES OF 10 EACH)

## (undated) DEVICE — SOLUTION IV 1000ML 0.9% SOD CHL

## (undated) DEVICE — BOOT POS LEG DEMAYO

## (undated) DEVICE — GLOVE ORANGE PI 7 1/2   MSG9075

## (undated) DEVICE — KIT TRK KNEE PROC VIZADISC

## (undated) DEVICE — DRAPE C ARM UNIV W41XL74IN CLR PLAS XR VELC CLSR POLY STRP

## (undated) DEVICE — KIT INT FIX FEM TIB CKPT MAKOPLASTY

## (undated) DEVICE — BLADE SURG SAW S STL NAR OSC W/ SERR EDGE DISP

## (undated) DEVICE — CONTAINER,SPECIMEN,O.R.STRL,4.5OZ: Brand: MEDLINE

## (undated) DEVICE — SOLUTION IRRIG 3000ML 0.9% SOD CHL USP UROMATIC PLAS CONT

## (undated) DEVICE — NEEDLE SPNL 20GA L3.5IN YEL HUB S STL REG WALL FIT STYL

## (undated) DEVICE — DRESSING,GAUZE,XEROFORM,CURAD,1"X8",ST: Brand: CURAD

## (undated) DEVICE — INTENDED FOR TISSUE SEPARATION, AND OTHER PROCEDURES THAT REQUIRE A SHARP SURGICAL BLADE TO PUNCTURE OR CUT.: Brand: BARD-PARKER ® CARBON RIB-BACK BLADES

## (undated) DEVICE — OCCLUSIVE GAUZE STRIP,3% BISMUTH TRIBROMOPHENATE IN PETROLATUM BLEND: Brand: XEROFORM

## (undated) DEVICE — SUTURE NONABSORBABLE MONOFILAMENT 4-0 PS-2 18 IN BLU PROLENE 8682H

## (undated) DEVICE — DERMACEA GAUZE ROLL: Brand: DERMACEA

## (undated) DEVICE — BLADE,CARBON-STEEL,15,STRL,DISPOSABLE,TB: Brand: MEDLINE

## (undated) DEVICE — PACK PROCEDURE SURG TOT KNEE CUST

## (undated) DEVICE — PREMIUM WET SKIN PREP TRAY: Brand: MEDLINE INDUSTRIES, INC.

## (undated) DEVICE — HOOD, PEEL-AWAY: Brand: FLYTE

## (undated) DEVICE — SKIN MARKER,REGULAR TIP WITH RULER AND LABELS: Brand: DEVON

## (undated) DEVICE — NEEDLE HYPO 25GA L1.5IN BVL ORIENTED ECLIPSE

## (undated) DEVICE — TOWEL,OR,DSP,ST,BLUE,STD,4/PK,20PK/CS: Brand: MEDLINE

## (undated) DEVICE — SPONGE GZ W4XL4IN COT 12 PLY TYP VII WVN C FLD DSGN

## (undated) DEVICE — GAMMEX® NON-LATEX SIZE 8, STERILE NEOPRENE POWDER-FREE SURGICAL GLOVE: Brand: GAMMEX

## (undated) DEVICE — CORD RETRCT SIL

## (undated) DEVICE — SYR 10ML CTRL LR LCK NSAF LF --

## (undated) DEVICE — SHEET,DRAPE,40X58,STERILE: Brand: MEDLINE

## (undated) DEVICE — 3 BONE CEMENT MIXER: Brand: MIXEVAC

## (undated) DEVICE — NEEDLE HYPO 25GA L1.5IN BLU POLYPR HUB S STL REG BVL STR